# Patient Record
Sex: FEMALE | Race: WHITE | Employment: UNEMPLOYED | URBAN - METROPOLITAN AREA
[De-identification: names, ages, dates, MRNs, and addresses within clinical notes are randomized per-mention and may not be internally consistent; named-entity substitution may affect disease eponyms.]

---

## 2021-10-26 ENCOUNTER — OFFICE VISIT (OUTPATIENT)
Dept: PRIMARY CARE CLINIC | Facility: CLINIC | Age: 26
End: 2021-10-26
Payer: MEDICAID

## 2021-10-26 ENCOUNTER — PATIENT MESSAGE (OUTPATIENT)
Dept: PRIMARY CARE CLINIC | Facility: CLINIC | Age: 26
End: 2021-10-26

## 2021-10-26 VITALS
HEIGHT: 64 IN | SYSTOLIC BLOOD PRESSURE: 127 MMHG | WEIGHT: 156 LBS | DIASTOLIC BLOOD PRESSURE: 59 MMHG | OXYGEN SATURATION: 99 % | BODY MASS INDEX: 26.63 KG/M2 | TEMPERATURE: 98 F | HEART RATE: 80 BPM

## 2021-10-26 DIAGNOSIS — A60.00 GENITAL HERPES SIMPLEX, UNSPECIFIED SITE: ICD-10-CM

## 2021-10-26 DIAGNOSIS — N89.8 VAGINAL DISCHARGE: ICD-10-CM

## 2021-10-26 DIAGNOSIS — N76.0 ACUTE VAGINITIS: Primary | ICD-10-CM

## 2021-10-26 PROCEDURE — 99999 PR PBB SHADOW E&M-NEW PATIENT-LVL III: ICD-10-PCS | Mod: PBBFAC,,, | Performed by: NURSE PRACTITIONER

## 2021-10-26 PROCEDURE — 87481 CANDIDA DNA AMP PROBE: CPT | Mod: 59 | Performed by: NURSE PRACTITIONER

## 2021-10-26 PROCEDURE — 99999 PR PBB SHADOW E&M-NEW PATIENT-LVL III: CPT | Mod: PBBFAC,,, | Performed by: NURSE PRACTITIONER

## 2021-10-26 PROCEDURE — 99203 OFFICE O/P NEW LOW 30 MIN: CPT | Mod: S$PBB,,, | Performed by: NURSE PRACTITIONER

## 2021-10-26 PROCEDURE — 99203 OFFICE O/P NEW LOW 30 MIN: CPT | Mod: PBBFAC,PN | Performed by: NURSE PRACTITIONER

## 2021-10-26 PROCEDURE — 99203 PR OFFICE/OUTPT VISIT, NEW, LEVL III, 30-44 MIN: ICD-10-PCS | Mod: S$PBB,,, | Performed by: NURSE PRACTITIONER

## 2021-10-26 RX ORDER — METRONIDAZOLE 500 MG/1
500 TABLET ORAL EVERY 12 HOURS
Qty: 14 TABLET | Refills: 0 | Status: SHIPPED | OUTPATIENT
Start: 2021-10-26 | End: 2021-11-02

## 2021-10-26 RX ORDER — VALACYCLOVIR HYDROCHLORIDE 500 MG/1
500 TABLET, FILM COATED ORAL 2 TIMES DAILY
COMMUNITY
Start: 2021-07-05 | End: 2021-10-27 | Stop reason: SDUPTHER

## 2021-10-26 RX ORDER — CIPROFLOXACIN 500 MG/1
TABLET ORAL
Status: ON HOLD | COMMUNITY
Start: 2021-09-27 | End: 2022-07-14 | Stop reason: HOSPADM

## 2021-10-27 LAB
BACTERIAL VAGINOSIS DNA: POSITIVE
CANDIDA GLABRATA DNA: NEGATIVE
CANDIDA KRUSEI DNA: NEGATIVE
CANDIDA RRNA VAG QL PROBE: NEGATIVE
T VAGINALIS RRNA GENITAL QL PROBE: NEGATIVE

## 2021-10-27 RX ORDER — VALACYCLOVIR HYDROCHLORIDE 500 MG/1
500 TABLET, FILM COATED ORAL 2 TIMES DAILY
Qty: 60 TABLET | Refills: 3 | Status: SHIPPED | OUTPATIENT
Start: 2021-10-27

## 2022-07-09 ENCOUNTER — HOSPITAL ENCOUNTER (INPATIENT)
Facility: HOSPITAL | Age: 27
LOS: 5 days | Discharge: HOME | DRG: 178 | End: 2022-07-14
Attending: EMERGENCY MEDICINE | Admitting: INTERNAL MEDICINE
Payer: MEDICAID

## 2022-07-09 DIAGNOSIS — L02.512 ABSCESS OF LEFT HAND: ICD-10-CM

## 2022-07-09 DIAGNOSIS — R05.9 COUGH: ICD-10-CM

## 2022-07-09 DIAGNOSIS — J85.1 ABSCESS OF LOWER LOBE OF RIGHT LUNG WITH PNEUMONIA: Primary | ICD-10-CM

## 2022-07-09 DIAGNOSIS — J18.9 PNEUMONIA: ICD-10-CM

## 2022-07-09 PROBLEM — Z72.0 TOBACCO USER: Status: ACTIVE | Noted: 2022-07-09

## 2022-07-09 PROBLEM — F19.10 SUBSTANCE ABUSE: Status: ACTIVE | Noted: 2022-07-09

## 2022-07-09 LAB
ALBUMIN SERPL BCP-MCNC: 2.8 G/DL (ref 3.5–5.2)
ALP SERPL-CCNC: 104 U/L (ref 55–135)
ALT SERPL W/O P-5'-P-CCNC: 27 U/L (ref 10–44)
AMPHET+METHAMPHET UR QL: ABNORMAL
ANION GAP SERPL CALC-SCNC: 16 MMOL/L (ref 8–16)
AST SERPL-CCNC: 23 U/L (ref 10–40)
B-HCG UR QL: NEGATIVE
BARBITURATES UR QL SCN>200 NG/ML: NEGATIVE
BASOPHILS # BLD AUTO: 0.02 K/UL (ref 0–0.2)
BASOPHILS NFR BLD: 0.2 % (ref 0–1.9)
BENZODIAZ UR QL SCN>200 NG/ML: NEGATIVE
BILIRUB SERPL-MCNC: 0.8 MG/DL (ref 0.1–1)
BUN SERPL-MCNC: 4 MG/DL (ref 6–20)
BZE UR QL SCN: NEGATIVE
CALCIUM SERPL-MCNC: 8.8 MG/DL (ref 8.7–10.5)
CANNABINOIDS UR QL SCN: NEGATIVE
CHLORIDE SERPL-SCNC: 92 MMOL/L (ref 95–110)
CO2 SERPL-SCNC: 26 MMOL/L (ref 23–29)
CREAT SERPL-MCNC: 0.6 MG/DL (ref 0.5–1.4)
CREAT UR-MCNC: 181 MG/DL (ref 15–325)
DIFFERENTIAL METHOD: ABNORMAL
EOSINOPHIL # BLD AUTO: 0.1 K/UL (ref 0–0.5)
EOSINOPHIL NFR BLD: 0.6 % (ref 0–8)
ERYTHROCYTE [DISTWIDTH] IN BLOOD BY AUTOMATED COUNT: 14.6 % (ref 11.5–14.5)
EST. GFR  (AFRICAN AMERICAN): >60 ML/MIN/1.73 M^2
EST. GFR  (NON AFRICAN AMERICAN): >60 ML/MIN/1.73 M^2
GLUCOSE SERPL-MCNC: 90 MG/DL (ref 70–110)
HCT VFR BLD AUTO: 29.9 % (ref 37–48.5)
HGB BLD-MCNC: 9.6 G/DL (ref 12–16)
IMM GRANULOCYTES # BLD AUTO: 0.05 K/UL (ref 0–0.04)
IMM GRANULOCYTES NFR BLD AUTO: 0.5 % (ref 0–0.5)
LACTATE SERPL-SCNC: 1.7 MMOL/L (ref 0.5–2.2)
LYMPHOCYTES # BLD AUTO: 2.2 K/UL (ref 1–4.8)
LYMPHOCYTES NFR BLD: 21.6 % (ref 18–48)
MCH RBC QN AUTO: 24.7 PG (ref 27–31)
MCHC RBC AUTO-ENTMCNC: 32.1 G/DL (ref 32–36)
MCV RBC AUTO: 77 FL (ref 82–98)
METHADONE UR QL SCN>300 NG/ML: NEGATIVE
MONOCYTES # BLD AUTO: 1.2 K/UL (ref 0.3–1)
MONOCYTES NFR BLD: 11.3 % (ref 4–15)
NEUTROPHILS # BLD AUTO: 6.7 K/UL (ref 1.8–7.7)
NEUTROPHILS NFR BLD: 65.8 % (ref 38–73)
NRBC BLD-RTO: 0 /100 WBC
OPIATES UR QL SCN: NEGATIVE
PCP UR QL SCN>25 NG/ML: NEGATIVE
PLATELET # BLD AUTO: 503 K/UL (ref 150–450)
PMV BLD AUTO: 10 FL (ref 9.2–12.9)
POTASSIUM SERPL-SCNC: 3.6 MMOL/L (ref 3.5–5.1)
PROCALCITONIN SERPL IA-MCNC: 0.56 NG/ML
PROT SERPL-MCNC: 8.4 G/DL (ref 6–8.4)
RBC # BLD AUTO: 3.89 M/UL (ref 4–5.4)
SARS-COV-2 RDRP RESP QL NAA+PROBE: NEGATIVE
SODIUM SERPL-SCNC: 134 MMOL/L (ref 136–145)
TOXICOLOGY INFORMATION: ABNORMAL
WBC # BLD AUTO: 10.23 K/UL (ref 3.9–12.7)

## 2022-07-09 PROCEDURE — 81025 URINE PREGNANCY TEST: CPT | Performed by: NURSE PRACTITIONER

## 2022-07-09 PROCEDURE — 87205 SMEAR GRAM STAIN: CPT | Performed by: EMERGENCY MEDICINE

## 2022-07-09 PROCEDURE — 84145 PROCALCITONIN (PCT): CPT | Performed by: NURSE PRACTITIONER

## 2022-07-09 PROCEDURE — 25000003 PHARM REV CODE 250: Performed by: INTERNAL MEDICINE

## 2022-07-09 PROCEDURE — 63600175 PHARM REV CODE 636 W HCPCS: Performed by: EMERGENCY MEDICINE

## 2022-07-09 PROCEDURE — 80307 DRUG TEST PRSMV CHEM ANLYZR: CPT | Performed by: NURSE PRACTITIONER

## 2022-07-09 PROCEDURE — 99285 EMERGENCY DEPT VISIT HI MDM: CPT | Mod: 25

## 2022-07-09 PROCEDURE — 87116 MYCOBACTERIA CULTURE: CPT | Performed by: EMERGENCY MEDICINE

## 2022-07-09 PROCEDURE — 87070 CULTURE OTHR SPECIMN AEROBIC: CPT | Performed by: EMERGENCY MEDICINE

## 2022-07-09 PROCEDURE — 96365 THER/PROPH/DIAG IV INF INIT: CPT

## 2022-07-09 PROCEDURE — 80053 COMPREHEN METABOLIC PANEL: CPT | Performed by: NURSE PRACTITIONER

## 2022-07-09 PROCEDURE — 83605 ASSAY OF LACTIC ACID: CPT | Performed by: NURSE PRACTITIONER

## 2022-07-09 PROCEDURE — 11000001 HC ACUTE MED/SURG PRIVATE ROOM

## 2022-07-09 PROCEDURE — 25000003 PHARM REV CODE 250: Performed by: EMERGENCY MEDICINE

## 2022-07-09 PROCEDURE — 36415 COLL VENOUS BLD VENIPUNCTURE: CPT | Performed by: NURSE PRACTITIONER

## 2022-07-09 PROCEDURE — 87040 BLOOD CULTURE FOR BACTERIA: CPT | Mod: 59 | Performed by: NURSE PRACTITIONER

## 2022-07-09 PROCEDURE — 87206 SMEAR FLUORESCENT/ACID STAI: CPT | Performed by: EMERGENCY MEDICINE

## 2022-07-09 PROCEDURE — 85025 COMPLETE CBC W/AUTO DIFF WBC: CPT | Performed by: NURSE PRACTITIONER

## 2022-07-09 PROCEDURE — 87015 SPECIMEN INFECT AGNT CONCNTJ: CPT | Performed by: EMERGENCY MEDICINE

## 2022-07-09 PROCEDURE — U0002 COVID-19 LAB TEST NON-CDC: HCPCS | Performed by: NURSE PRACTITIONER

## 2022-07-09 RX ORDER — CEFEPIME HYDROCHLORIDE 1 G/50ML
2 INJECTION, SOLUTION INTRAVENOUS
Status: DISCONTINUED | OUTPATIENT
Start: 2022-07-10 | End: 2022-07-13

## 2022-07-09 RX ORDER — OXYCODONE AND ACETAMINOPHEN 5; 325 MG/1; MG/1
1 TABLET ORAL EVERY 4 HOURS PRN
Status: DISCONTINUED | OUTPATIENT
Start: 2022-07-09 | End: 2022-07-14 | Stop reason: HOSPADM

## 2022-07-09 RX ORDER — GUAIFENESIN 100 MG/5ML
200 SOLUTION ORAL EVERY 4 HOURS PRN
Status: DISCONTINUED | OUTPATIENT
Start: 2022-07-09 | End: 2022-07-14 | Stop reason: HOSPADM

## 2022-07-09 RX ORDER — ACETAMINOPHEN 325 MG/1
650 TABLET ORAL EVERY 6 HOURS PRN
Status: DISCONTINUED | OUTPATIENT
Start: 2022-07-09 | End: 2022-07-14 | Stop reason: HOSPADM

## 2022-07-09 RX ORDER — SODIUM CHLORIDE 9 MG/ML
INJECTION, SOLUTION INTRAVENOUS CONTINUOUS
Status: ACTIVE | OUTPATIENT
Start: 2022-07-09 | End: 2022-07-10

## 2022-07-09 RX ORDER — MORPHINE SULFATE 4 MG/ML
4 INJECTION, SOLUTION INTRAMUSCULAR; INTRAVENOUS EVERY 4 HOURS PRN
Status: DISCONTINUED | OUTPATIENT
Start: 2022-07-09 | End: 2022-07-10

## 2022-07-09 RX ORDER — OXYCODONE AND ACETAMINOPHEN 10; 325 MG/1; MG/1
1 TABLET ORAL EVERY 4 HOURS PRN
Status: DISCONTINUED | OUTPATIENT
Start: 2022-07-09 | End: 2022-07-14 | Stop reason: HOSPADM

## 2022-07-09 RX ORDER — ONDANSETRON 2 MG/ML
4 INJECTION INTRAMUSCULAR; INTRAVENOUS EVERY 8 HOURS PRN
Status: DISCONTINUED | OUTPATIENT
Start: 2022-07-09 | End: 2022-07-14 | Stop reason: HOSPADM

## 2022-07-09 RX ORDER — IPRATROPIUM BROMIDE AND ALBUTEROL SULFATE 2.5; .5 MG/3ML; MG/3ML
3 SOLUTION RESPIRATORY (INHALATION) EVERY 4 HOURS PRN
Status: DISCONTINUED | OUTPATIENT
Start: 2022-07-09 | End: 2022-07-14 | Stop reason: HOSPADM

## 2022-07-09 RX ORDER — CEFEPIME HYDROCHLORIDE 1 G/50ML
2 INJECTION, SOLUTION INTRAVENOUS
Status: COMPLETED | OUTPATIENT
Start: 2022-07-09 | End: 2022-07-09

## 2022-07-09 RX ORDER — IBUPROFEN 200 MG
1 TABLET ORAL DAILY
Status: DISCONTINUED | OUTPATIENT
Start: 2022-07-10 | End: 2022-07-14 | Stop reason: HOSPADM

## 2022-07-09 RX ORDER — MAG HYDROX/ALUMINUM HYD/SIMETH 200-200-20
30 SUSPENSION, ORAL (FINAL DOSE FORM) ORAL EVERY 6 HOURS PRN
Status: DISCONTINUED | OUTPATIENT
Start: 2022-07-09 | End: 2022-07-14 | Stop reason: HOSPADM

## 2022-07-09 RX ADMIN — OXYCODONE AND ACETAMINOPHEN 1 TABLET: 10; 325 TABLET ORAL at 11:07

## 2022-07-09 RX ADMIN — CEFEPIME 2 G: 2 INJECTION, POWDER, FOR SOLUTION INTRAVENOUS at 09:07

## 2022-07-09 RX ADMIN — SODIUM CHLORIDE: 0.9 INJECTION, SOLUTION INTRAVENOUS at 11:07

## 2022-07-09 RX ADMIN — SODIUM CHLORIDE 1000 ML: 0.9 INJECTION, SOLUTION INTRAVENOUS at 09:07

## 2022-07-09 NOTE — Clinical Note
Diagnosis: Pneumonia [780277]   Admitting Provider:: CHEY ROE [[id number]]   Future Attending Provider: CHEY ROE [[id number]]   Reason for IP Medical Treatment  (Clinical interventions that can only be accomplished in the IP setting? ) :: Abscess, IV abx   Estimated Length of Stay:: 2 midnights   I certify that Inpatient services for greater than or equal to 2 midnights are medically necessary:: Yes   Plans for Post-Acute care--if anticipated (pick the single best option):: A. No post acute care anticipated at this time   Special Needs:: No Special Needs [1]

## 2022-07-10 PROBLEM — R60.0 EDEMA OF RIGHT UPPER ARM: Status: ACTIVE | Noted: 2022-07-10

## 2022-07-10 PROCEDURE — 25000003 PHARM REV CODE 250: Performed by: FAMILY MEDICINE

## 2022-07-10 PROCEDURE — 99223 1ST HOSP IP/OBS HIGH 75: CPT | Mod: ,,, | Performed by: INTERNAL MEDICINE

## 2022-07-10 PROCEDURE — 11000001 HC ACUTE MED/SURG PRIVATE ROOM

## 2022-07-10 PROCEDURE — 63600175 PHARM REV CODE 636 W HCPCS: Performed by: INTERNAL MEDICINE

## 2022-07-10 PROCEDURE — 25000003 PHARM REV CODE 250: Performed by: INTERNAL MEDICINE

## 2022-07-10 PROCEDURE — 99223 PR INITIAL HOSPITAL CARE,LEVL III: ICD-10-PCS | Mod: ,,, | Performed by: INTERNAL MEDICINE

## 2022-07-10 PROCEDURE — 25000003 PHARM REV CODE 250: Performed by: NURSE PRACTITIONER

## 2022-07-10 PROCEDURE — 63600175 PHARM REV CODE 636 W HCPCS: Performed by: EMERGENCY MEDICINE

## 2022-07-10 PROCEDURE — 76937 US GUIDE VASCULAR ACCESS: CPT

## 2022-07-10 PROCEDURE — C1751 CATH, INF, PER/CENT/MIDLINE: HCPCS

## 2022-07-10 PROCEDURE — 25000003 PHARM REV CODE 250: Performed by: EMERGENCY MEDICINE

## 2022-07-10 PROCEDURE — 36410 VNPNXR 3YR/> PHY/QHP DX/THER: CPT

## 2022-07-10 RX ORDER — VANCOMYCIN HCL IN 5 % DEXTROSE 1G/250ML
1000 PLASTIC BAG, INJECTION (ML) INTRAVENOUS
Status: DISCONTINUED | OUTPATIENT
Start: 2022-07-10 | End: 2022-07-11

## 2022-07-10 RX ORDER — METHADONE HYDROCHLORIDE 10 MG/1
10 TABLET ORAL DAILY
Status: DISCONTINUED | OUTPATIENT
Start: 2022-07-10 | End: 2022-07-14 | Stop reason: HOSPADM

## 2022-07-10 RX ORDER — TALC
6 POWDER (GRAM) TOPICAL NIGHTLY PRN
Status: DISCONTINUED | OUTPATIENT
Start: 2022-07-10 | End: 2022-07-14 | Stop reason: HOSPADM

## 2022-07-10 RX ORDER — TRAZODONE HYDROCHLORIDE 50 MG/1
50 TABLET ORAL ONCE
Status: COMPLETED | OUTPATIENT
Start: 2022-07-10 | End: 2022-07-10

## 2022-07-10 RX ORDER — METRONIDAZOLE 500 MG/1
500 TABLET ORAL EVERY 8 HOURS
Status: DISCONTINUED | OUTPATIENT
Start: 2022-07-10 | End: 2022-07-13

## 2022-07-10 RX ADMIN — CEFEPIME 2 G: 2 INJECTION, POWDER, FOR SOLUTION INTRAVENOUS at 04:07

## 2022-07-10 RX ADMIN — OXYCODONE AND ACETAMINOPHEN 1 TABLET: 10; 325 TABLET ORAL at 03:07

## 2022-07-10 RX ADMIN — METRONIDAZOLE 500 MG: 500 TABLET ORAL at 09:07

## 2022-07-10 RX ADMIN — OXYCODONE AND ACETAMINOPHEN 1 TABLET: 10; 325 TABLET ORAL at 04:07

## 2022-07-10 RX ADMIN — VANCOMYCIN HYDROCHLORIDE 1500 MG: 1.5 INJECTION, POWDER, LYOPHILIZED, FOR SOLUTION INTRAVENOUS at 12:07

## 2022-07-10 RX ADMIN — METHADONE HYDROCHLORIDE 10 MG: 10 TABLET ORAL at 11:07

## 2022-07-10 RX ADMIN — TRAZODONE HYDROCHLORIDE 50 MG: 50 TABLET ORAL at 11:07

## 2022-07-10 RX ADMIN — METRONIDAZOLE 500 MG: 500 TABLET ORAL at 01:07

## 2022-07-10 RX ADMIN — VANCOMYCIN HYDROCHLORIDE 1000 MG: 1 INJECTION, POWDER, FOR SOLUTION INTRAVENOUS at 11:07

## 2022-07-10 RX ADMIN — CEFEPIME 2 G: 2 INJECTION, POWDER, FOR SOLUTION INTRAVENOUS at 09:07

## 2022-07-10 RX ADMIN — VANCOMYCIN HYDROCHLORIDE 1000 MG: 1 INJECTION, POWDER, FOR SOLUTION INTRAVENOUS at 12:07

## 2022-07-10 RX ADMIN — CEFEPIME 2 G: 2 INJECTION, POWDER, FOR SOLUTION INTRAVENOUS at 01:07

## 2022-07-10 NOTE — HPI
Carolina Frank is 27 y.o.  Asked to see for Abn CXR and chest CT  Presented with cough, chills, pleuritic chest pain, change sputum > 7 days  Smoker 1/2 PPD  Respiratory symptoms preceeded smoking METH  Homeless, living with male friend who supports drug habit  Had some hemoptysis last week not brown sputum  No night sweat, weight loss    Procal 0.56, UDS +ve amph  
Ms. Frank is a 27-year-old  female with no significant medical problems, substance abuse, tobacco user, presented to the ED complaining of for 5-6 days of right-sided pleuritic-type chest discomfort, associated with dry nonproductive cough.  She denies fever, chills.  Not requiring supplemental oxygen.  In the ED she was found to have 5.1 x 2.2 x 4.5 cm cavitary lesion with air-fluid level in the right medial lower lobe, concerning for abscess.  Received IV vancomycin, cefepime in the ED.  afebrile, hemodynamically stable.  UDS positive for amphetamines.    Admitting diagnosis:  Right lung abscess  
None

## 2022-07-10 NOTE — HOSPITAL COURSE
7/10:  Continue broad-spectrum antibiotics.  Cultures pending.  Patient currently stable on room air.  Plan for midline.  Patient endorses using heroin daily.  Will start  methadone for withdrawal symptoms.  7/11 Left hand abscess. Consult General surgery for possible I&D. Add daily hibiclens bath.  7/12 Ortho consulted for I&D of left hand abscess. Add ativan for anxiety.   7/13 Monitor anemia. Add Iv venofer. Repeat Ct scan of chest.  consulted to provide information for inpatient and outpatient options for substance abuse.   7/14 Patient stable and improved. CT scan chest improved. Patient discharged to home. Long discussion with Patient who verbalized wishes to quit Heroine. Patient given information for options for substance abuse treatment.

## 2022-07-10 NOTE — H&P
[facility] - Emergency Dept.  [facility] Medicine  History & Physical    Patient Name: Carolina Frank  MRN: [id number]  Patient Class: IP- Inpatient  Admission Date: 2022  Attending Physician: Aime Childress,*   Primary Care Provider: Primary Doctor No         Patient information was obtained from patient, past medical records and ER records.     Subjective:     Principal Problem:Abscess of middle lobe of right lung with pneumonia    Chief Complaint:   Chief Complaint   Patient presents with    Cough     Cough, productive cough since last Tuesday, right sided rib pain worse when coughing, requesting 'xray of lung'        HPI: Ms. Frank is a 27-year-old  female with no significant medical problems, substance abuse, tobacco user, presented to the ED complaining of for 5-6 days of right-sided pleuritic-type chest discomfort, associated with dry nonproductive cough.  She denies fever, chills.  Not requiring supplemental oxygen.  In the ED she was found to have 5.1 x 2.2 x 4.5 cm cavitary lesion with air-fluid level in the right medial lower lobe, concerning for abscess.  Received IV vancomycin, cefepime in the ED.  afebrile, hemodynamically stable.  UDS positive for amphetamines.    Admitting diagnosis:  Right lung abscess      Past Medical History:   Diagnosis Date    Overdose of illicit drug        History reviewed. No pertinent surgical history.    Review of patient's allergies indicates:  No Known Allergies    No current facility-administered medications on file prior to encounter.     Current Outpatient Medications on File Prior to Encounter   Medication Sig    ciprofloxacin HCl (CIPRO) 500 MG tablet SMARTSI Tablet(s) By Mouth Every 12 Hours    valACYclovir (VALTREX) 500 MG tablet Take 1 tablet (500 mg total) by mouth 2 (two) times daily.     Family History    Reviewed and Not Pertinent       Tobacco Use    Smoking status: Current Every Day Smoker     Types: Cigarettes    Smokeless tobacco:  Never Used   Substance and Sexual Activity    Alcohol use: Not Currently    Drug use: Not Currently    Sexual activity: Yes     Partners: Male     Birth control/protection: Injection     Review of Systems   Constitutional:  Positive for fatigue. Negative for chills and fever.   HENT: Negative.  Negative for congestion, rhinorrhea, sore throat and trouble swallowing.    Eyes: Negative.  Negative for visual disturbance.   Respiratory:  Positive for cough and shortness of breath. Negative for wheezing.    Cardiovascular:  Positive for chest pain (pleuritic right sided). Negative for palpitations.   Gastrointestinal: Negative.  Negative for abdominal pain, diarrhea, nausea and vomiting.   Endocrine: Negative.    Genitourinary: Negative.  Negative for dysuria and flank pain.   Musculoskeletal: Negative.  Negative for back pain.   Skin: Negative.  Negative for rash.   Allergic/Immunologic: Negative.    Neurological: Negative.  Negative for speech difficulty, weakness, numbness and headaches.   Hematological: Negative.    Psychiatric/Behavioral: Negative.  Negative for hallucinations. The patient is not nervous/anxious.    All other systems reviewed and are negative.    Objective:     Vital Signs (Most Recent):  Temp: 98.3 °F (36.8 °C) (07/09/22 1723)  Pulse: 94 (07/09/22 2124)  Resp: 18 (07/09/22 2124)  BP: 111/66 (07/09/22 2124)  SpO2: 100 % (07/09/22 2124) Vital Signs (24h Range):  Temp:  [98.3 °F (36.8 °C)] 98.3 °F (36.8 °C)  Pulse:  [] 94  Resp:  [18] 18  SpO2:  [98 %-100 %] 100 %  BP: (111-144)/(66-73) 111/66     Weight: 56.1 kg (123 lb 12.6 oz)  Body mass index is 21.25 kg/m².    Physical Exam  Vitals and nursing note reviewed.   Constitutional:       General: She is awake. She is not in acute distress.     Appearance: She is ill-appearing.   HENT:      Head: Normocephalic and atraumatic.      Mouth/Throat:      Mouth: Mucous membranes are moist.   Eyes:      General: No scleral icterus.      Conjunctiva/sclera: Conjunctivae normal.   Cardiovascular:      Rate and Rhythm: Normal rate and regular rhythm.      Heart sounds: No murmur heard.  Pulmonary:      Effort: Pulmonary effort is normal. No respiratory distress.      Breath sounds: Rhonchi present. No wheezing.   Abdominal:      Palpations: Abdomen is soft.      Tenderness: There is no abdominal tenderness.   Musculoskeletal:         General: No swelling. Normal range of motion.      Cervical back: Neck supple.   Skin:     General: Skin is warm.      Coloration: Skin is not jaundiced.   Neurological:      General: No focal deficit present.      Mental Status: She is alert and oriented to person, place, and time. Mental status is at baseline.   Psychiatric:         Attention and Perception: Attention normal.         Speech: Speech normal.         Behavior: Behavior is cooperative.           Significant Labs: All pertinent labs within the past 24 hours have been reviewed.  BMP:   Recent Labs   Lab 07/09/22 2108   GLU 90   *   K 3.6   CL 92*   CO2 26   BUN 4*   CREATININE 0.6   CALCIUM 8.8     CBC:   Recent Labs   Lab 07/09/22 2108   WBC 10.23   HGB 9.6*   HCT 29.9*   *     CMP:   Recent Labs   Lab 07/09/22 2108   *   K 3.6   CL 92*   CO2 26   GLU 90   BUN 4*   CREATININE 0.6   CALCIUM 8.8   PROT 8.4   ALBUMIN 2.8*   BILITOT 0.8   ALKPHOS 104   AST 23   ALT 27   ANIONGAP 16   EGFRNONAA >60       Significant Imaging: I have reviewed all pertinent imaging results/findings within the past 24 hours.  I have reviewed and interpreted all pertinent imaging results/findings within the past 24 hours.  CT: I have reviewed all pertinent results/findings within the past 24 hours and my personal findings are:       Imaging Results              CT Chest Without Contrast (Final result)  Result time 07/09/22 20:23:30      Final result by Ambrose Still MD (07/09/22 20:23:30)                   Impression:      Right lower lobe pneumonia with cavity  formation and air-fluid level measuring 5.1 x 2.2 x 4.5 cm suggestive of associated abscess    All CT scans at this facility use dose modulation, iterative reconstruction and/or weight based dosing when appropriate to reduce radiation dose to as low as reasonably achievable.      Electronically signed by: Cheko Boggs  Date:    07/09/2022  Time:    20:23               Narrative:    EXAMINATION:  CT CHEST WITHOUT CONTRAST    CLINICAL HISTORY:  Cough, persistent;abdnormal chest x-ray, radiologist recommended;    TECHNIQUE:  5 mm axial images were acquired using helical CT technique from the lung apices through costophrenic sulci.  No intravenous contrast was administered.    COMPARISON:  Prior radiograph    FINDINGS:  Air-fluid level involving the cavity in the medial right lower lobe measuring 5.1 x 2.2  x 4.5 cm worrisome for abscess.  There is adjacent consolidation involving the superior segment of right lower lobe extending to the basilar segment of right lower lobe.                                       X-Ray Chest PA And Lateral (Final result)  Result time 07/09/22 18:43:54      Final result by Ambrose Still MD (07/09/22 18:43:54)                   Impression:      Unusual focal opacity along the right hilar border with questionable air-fluid level.  Question pneumonia.  This is only seen on the frontal projection and not the lateral projection.  Recommend follow-up CT.      Electronically signed by: Cheko Boggs  Date:    07/09/2022  Time:    18:43               Narrative:    EXAMINATION:  XR CHEST PA AND LATERAL    CLINICAL HISTORY:  Cough, unspecified    TECHNIQUE:  PA and lateral views of the chest were performed.    COMPARISON:  None    FINDINGS:  Air-fluid level and opacity along the right hilar border.  No acute osseous injury.  Cardiac silhouette within normal limits.                                      I have independently reviewed and interpreted the EKG.     I have independently reviewed all  pertinent labs within the past 24 hours.    I have independently reviewed, visualized and interpreted all pertinent imaging results within the past 24 hours and discussed the findings with the ED physician, Dr. Childress          Assessment/Plan:     * Abscess of middle lobe of right lung with pneumonia  -continue IV vancomycin, IV cefepime empirically.  -bronchodilators as needed.  -supplemental oxygen as needed.  -pulmonary consult in a.m..  -keep NPO past midnight.      Substance abuse  -UDS positive for amphetamines      Tobacco user  -nicotine patch        VTE Risk Mitigation (From admission, onward)         Ordered     Place sequential compression device  Until discontinued         07/09/22 2244                 The patient is placed in OBSERVATION status.    Zack Farmer MD  Department of Hospital Medicine   [facility] - Emergency Dept.

## 2022-07-10 NOTE — ED PROVIDER NOTES
SCRIBE #1 NOTE: I, Yoshi Taylor, am scribing for, and in the presence of, Aime Childress MD. I have scribed the entire note.       History     Chief Complaint   Patient presents with    Cough     Cough, productive cough since last Tuesday, right sided rib pain worse when coughing, requesting 'xray of lung'     Review of patient's allergies indicates:  No Known Allergies      History of Present Illness     HPI    7/9/2022, 8:47 PM  History obtained from the patient      History of Present Illness: Carolina Frank is a 27 y.o. female patient who presents to the Emergency Department for evaluation of cough which onset gradually 4 days pta. Pt states she has been coughing up blood and phlegm along with having right sided rib pain that worsens with coughing. Symptoms are constant and moderate in severity. No other mitigating or exacerbating factors reported. Associated sxs include chills and diaphoresis. Patient denies any fever, HA, n/v/d, weakness, numbness, abdominal pain, back pain, and all other sxs at this time. No prior Tx reported. No further complaints or concerns at this time.       Arrival mode: Personal vehicle    PCP: Primary Doctor No        Past Medical History:  Past Medical History:   Diagnosis Date    Overdose of illicit drug        Past Surgical History:  History reviewed. No pertinent surgical history.      Family History:  History reviewed. No pertinent family history.    Social History:  Social History     Tobacco Use    Smoking status: Current Every Day Smoker     Types: Cigarettes    Smokeless tobacco: Never Used   Substance and Sexual Activity    Alcohol use: Not Currently    Drug use: Not Currently    Sexual activity: Yes     Partners: Male     Birth control/protection: Injection        Review of Systems     Review of Systems   Constitutional: Positive for chills and diaphoresis. Negative for fever.   HENT: Negative for sore throat.    Respiratory: Positive for cough. Negative for  "shortness of breath.    Cardiovascular: Positive for chest pain (Right sided).   Gastrointestinal: Negative for abdominal pain, diarrhea, nausea and vomiting.   Genitourinary: Negative for dysuria.   Musculoskeletal: Negative for back pain.   Skin: Negative for rash.   Neurological: Negative for dizziness, weakness, numbness and headaches.   Hematological: Does not bruise/bleed easily.   All other systems reviewed and are negative.     Physical Exam     Initial Vitals [07/09/22 1723]   BP Pulse Resp Temp SpO2   (!) 144/73 (!) 117 18 98.3 °F (36.8 °C) 98 %      MAP       --          Physical Exam   Nursing Notes and Vital Signs Reviewed.  Constitutional: Patient is in no acute distress. Well-developed and well-nourished.  Head: Atraumatic. Normocephalic.  Eyes: PERRL. EOM intact. Conjunctivae are not pale. No scleral icterus.  ENT: Mucous membranes are moist. Oropharynx is clear and symmetric.    Neck: Supple. Full ROM. No lymphadenopathy.  Cardiovascular: Regular rate. Regular rhythm. No murmurs, rubs, or gallops. Distal pulses are 2+ and symmetric.  Pulmonary/Chest: No respiratory distress. Right basilar rhonchi present.  Abdominal: Soft and non-distended.  There is no tenderness.  No rebound, guarding, or rigidity. Good bowel sounds.  Genitourinary: No CVA tenderness  Musculoskeletal: Moves all extremities. No obvious deformities. No edema. No calf tenderness.  Skin: Warm and dry.  Neurological:  Alert, awake, and appropriate.  Normal speech.  No acute focal neurological deficits are appreciated.  Psychiatric: Normal affect. Good eye contact. Appropriate in content.     ED Course   Procedures  ED Vital Signs:  Vitals:    07/09/22 1723 07/09/22 2124 07/09/22 2300 07/09/22 2303   BP: (!) 144/73 111/66 110/60    Pulse: (!) 117 94 98    Resp: 18 18 18 18   Temp: 98.3 °F (36.8 °C)  98.2 °F (36.8 °C)    TempSrc: Oral      SpO2: 98% 100% 98%    Weight: 56.1 kg (123 lb 12.6 oz)      Height: 5' 4" (1.626 m)    "       Abnormal Lab Results:  Labs Reviewed   CBC W/ AUTO DIFFERENTIAL - Abnormal; Notable for the following components:       Result Value    RBC 3.89 (*)     Hemoglobin 9.6 (*)     Hematocrit 29.9 (*)     MCV 77 (*)     MCH 24.7 (*)     RDW 14.6 (*)     Platelets 503 (*)     Immature Grans (Abs) 0.05 (*)     Mono # 1.2 (*)     All other components within normal limits   COMPREHENSIVE METABOLIC PANEL - Abnormal; Notable for the following components:    Sodium 134 (*)     Chloride 92 (*)     BUN 4 (*)     Albumin 2.8 (*)     All other components within normal limits   DRUG SCREEN PANEL, URINE EMERGENCY - Abnormal; Notable for the following components:    Amphetamine Screen, Ur Presumptive Positive (*)     All other components within normal limits    Narrative:     Specimen Source->Urine   PROCALCITONIN - Abnormal; Notable for the following components:    Procalcitonin 0.56 (*)     All other components within normal limits   CULTURE, BLOOD   CULTURE, BLOOD   CULTURE, RESPIRATORY   AFB CULTURE & SMEAR   PREGNANCY TEST, URINE RAPID    Narrative:     Specimen Source->Urine   SARS-COV-2 RNA AMPLIFICATION, QUAL   LACTIC ACID, PLASMA   DRUG SCREEN PANEL, URINE EMERGENCY   PROCALCITONIN   CBC W/ AUTO DIFFERENTIAL   MAGNESIUM   COMPREHENSIVE METABOLIC PANEL        All Lab Results:  Results for orders placed or performed during the hospital encounter of 07/09/22   Pregnancy, urine rapid (UPT)   Result Value Ref Range    Preg Test, Ur Negative    COVID-19 Rapid Screening   Result Value Ref Range    SARS-CoV-2 RNA, Amplification, Qual Negative Negative   CBC auto differential   Result Value Ref Range    WBC 10.23 3.90 - 12.70 K/uL    RBC 3.89 (L) 4.00 - 5.40 M/uL    Hemoglobin 9.6 (L) 12.0 - 16.0 g/dL    Hematocrit 29.9 (L) 37.0 - 48.5 %    MCV 77 (L) 82 - 98 fL    MCH 24.7 (L) 27.0 - 31.0 pg    MCHC 32.1 32.0 - 36.0 g/dL    RDW 14.6 (H) 11.5 - 14.5 %    Platelets 503 (H) 150 - 450 K/uL    MPV 10.0 9.2 - 12.9 fL    Immature  Granulocytes 0.5 0.0 - 0.5 %    Gran # (ANC) 6.7 1.8 - 7.7 K/uL    Immature Grans (Abs) 0.05 (H) 0.00 - 0.04 K/uL    Lymph # 2.2 1.0 - 4.8 K/uL    Mono # 1.2 (H) 0.3 - 1.0 K/uL    Eos # 0.1 0.0 - 0.5 K/uL    Baso # 0.02 0.00 - 0.20 K/uL    nRBC 0 0 /100 WBC    Gran % 65.8 38.0 - 73.0 %    Lymph % 21.6 18.0 - 48.0 %    Mono % 11.3 4.0 - 15.0 %    Eosinophil % 0.6 0.0 - 8.0 %    Basophil % 0.2 0.0 - 1.9 %    Differential Method Automated    Comprehensive metabolic panel   Result Value Ref Range    Sodium 134 (L) 136 - 145 mmol/L    Potassium 3.6 3.5 - 5.1 mmol/L    Chloride 92 (L) 95 - 110 mmol/L    CO2 26 23 - 29 mmol/L    Glucose 90 70 - 110 mg/dL    BUN 4 (L) 6 - 20 mg/dL    Creatinine 0.6 0.5 - 1.4 mg/dL    Calcium 8.8 8.7 - 10.5 mg/dL    Total Protein 8.4 6.0 - 8.4 g/dL    Albumin 2.8 (L) 3.5 - 5.2 g/dL    Total Bilirubin 0.8 0.1 - 1.0 mg/dL    Alkaline Phosphatase 104 55 - 135 U/L    AST 23 10 - 40 U/L    ALT 27 10 - 44 U/L    Anion Gap 16 8 - 16 mmol/L    eGFR if African American >60 >60 mL/min/1.73 m^2    eGFR if non African American >60 >60 mL/min/1.73 m^2   Lactic acid, plasma   Result Value Ref Range    Lactate (Lactic Acid) 1.7 0.5 - 2.2 mmol/L   Drug screen panel, in-house   Result Value Ref Range    Benzodiazepines Negative Negative    Methadone metabolites Negative Negative    Cocaine (Metab.) Negative Negative    Opiate Scrn, Ur Negative Negative    Barbiturate Screen, Ur Negative Negative    Amphetamine Screen, Ur Presumptive Positive (A) Negative    THC Negative Negative    Phencyclidine Negative Negative    Creatinine, Urine 181.0 15.0 - 325.0 mg/dL    Toxicology Information SEE COMMENT    Procalcitonin   Result Value Ref Range    Procalcitonin 0.56 (H) <0.25 ng/mL         Imaging Results:  Imaging Results          CT Chest Without Contrast (Final result)  Result time 07/09/22 20:23:30    Final result by Ambrose Still MD (07/09/22 20:23:30)                 Impression:      Right lower lobe  pneumonia with cavity formation and air-fluid level measuring 5.1 x 2.2 x 4.5 cm suggestive of associated abscess    All CT scans at this facility use dose modulation, iterative reconstruction and/or weight based dosing when appropriate to reduce radiation dose to as low as reasonably achievable.      Electronically signed by: Cheko Boggs  Date:    07/09/2022  Time:    20:23             Narrative:    EXAMINATION:  CT CHEST WITHOUT CONTRAST    CLINICAL HISTORY:  Cough, persistent;abdnormal chest x-ray, radiologist recommended;    TECHNIQUE:  5 mm axial images were acquired using helical CT technique from the lung apices through costophrenic sulci.  No intravenous contrast was administered.    COMPARISON:  Prior radiograph    FINDINGS:  Air-fluid level involving the cavity in the medial right lower lobe measuring 5.1 x 2.2  x 4.5 cm worrisome for abscess.  There is adjacent consolidation involving the superior segment of right lower lobe extending to the basilar segment of right lower lobe.                               X-Ray Chest PA And Lateral (Final result)  Result time 07/09/22 18:43:54    Final result by Ambrose Still MD (07/09/22 18:43:54)                 Impression:      Unusual focal opacity along the right hilar border with questionable air-fluid level.  Question pneumonia.  This is only seen on the frontal projection and not the lateral projection.  Recommend follow-up CT.      Electronically signed by: Cheko Boggs  Date:    07/09/2022  Time:    18:43             Narrative:    EXAMINATION:  XR CHEST PA AND LATERAL    CLINICAL HISTORY:  Cough, unspecified    TECHNIQUE:  PA and lateral views of the chest were performed.    COMPARISON:  None    FINDINGS:  Air-fluid level and opacity along the right hilar border.  No acute osseous injury.  Cardiac silhouette within normal limits.                                 The Emergency Provider reviewed the vital signs and test results, which are outlined above.      ED Discussion     8:50 PM: Discussed pt's case with Dr. Savage (Pulmonary Disease) who recommends ordering abx and sputum cultures and states he will see the pt in the morning.    10:36 PM: Discussed case with Dr. Monroy ([facility]). Dr. Garcia agrees with current care and management of pt and accepts admission.   Admitting Service: Hospital Medicine  Admitting Physician: Dr. Garcia  Admit to: Inpatient Med Surg Team A    10:36 PM: Re-evaluated pt. I have discussed test results, shared treatment plan, and the need for admission with patient and family at bedside. Pt and family express understanding at this time and agree with all information. All questions answered. Pt and family have no further questions or concerns at this time. Pt is ready for admit.         Medical Decision Making:   Clinical Tests:   Lab Tests: Ordered and Reviewed  Radiological Study: Ordered and Reviewed           ED Medication(s):  Medications   vancomycin - pharmacy to dose (has no administration in time range)   vancomycin 1.5 g in dextrose 5 % 250 mL IVPB (ready to mix) (1,500 mg Intravenous New Bag 7/10/22 0033)   cefepime in dextrose 5 % IVPB 2 g (has no administration in time range)   acetaminophen tablet 650 mg (has no administration in time range)   ondansetron injection 4 mg (has no administration in time range)   guaiFENesin 100 mg/5 ml syrup 200 mg (has no administration in time range)   aluminum-magnesium hydroxide-simethicone 200-200-20 mg/5 mL suspension 30 mL (has no administration in time range)   albuterol-ipratropium 2.5 mg-0.5 mg/3 mL nebulizer solution 3 mL (has no administration in time range)   0.9%  NaCl infusion ( Intravenous New Bag 7/9/22 2303)   oxyCODONE-acetaminophen 5-325 mg per tablet 1 tablet (has no administration in time range)   oxyCODONE-acetaminophen  mg per tablet 1 tablet (1 tablet Oral Given 7/9/22 2303)   morphine injection 4 mg (has no administration in time range)   nicotine 21 mg/24 hr 1  patch (has no administration in time range)   cefepime in dextrose 5 % IVPB 2 g (0 g Intravenous Stopped 7/9/22 2148)   sodium chloride 0.9% bolus 1,000 mL (0 mLs Intravenous Stopped 7/9/22 2212)       New Prescriptions    No medications on file               Scribe Attestation:   Scribe #1: I performed the above scribed service and the documentation accurately describes the services I performed. I attest to the accuracy of the note.     Attending:   Physician Attestation Statement for Scribe #1: I, Aime Childress MD, personally performed the services described in this documentation, as scribed by Yoshi Vazquez, in my presence, and it is both accurate and complete.           Clinical Impression       ICD-10-CM ICD-9-CM   1. Abscess of lower lobe of right lung with pneumonia  J85.1 513.0   2. Cough  R05.9 786.2   3. Pneumonia  J18.9 486       Disposition:   Disposition: Admitted  Condition: Fair         Aime Childress MD  07/10/22 0116

## 2022-07-10 NOTE — PLAN OF CARE
Pt on airborne isolation precautions. Pt has no emergency contact for SW to do initial assessment.

## 2022-07-10 NOTE — PROGRESS NOTES
Pharmacokinetic Initial Assessment: IV Vancomycin    Assessment/Plan:    Initiate intravenous vancomycin with loading dose of 1500 mg once followed by a maintenance dose of vancomycin 1000 mg IV every 12 hours  Desired empiric serum trough concentration is 15 to 20 mcg/mL  Draw vancomycin trough level 60 min prior to fourth dose on 07/11 at approximately 1130  Pharmacy will continue to follow and monitor vancomycin.      Please contact pharmacy at extension [phone] with any questions regarding this assessment.     Thank you for the consult,   Jazmin Lira       Patient brief summary:  Carolina Frank is a 27 y.o. female initiated on antimicrobial therapy with IV Vancomycin for treatment of suspected lower respiratory infection    Drug Allergies:   Review of patient's allergies indicates:  No Known Allergies    Actual Body Weight:   56.1 kg    Renal Function:   Estimated Creatinine Clearance: 122.7 mL/min (based on SCr of 0.6 mg/dL).,     Dialysis Method (if applicable):  N/A    CBC (last 72 hours):  Recent Labs   Lab Result Units 07/09/22  2108   WBC K/uL 10.23   Hemoglobin g/dL 9.6*   Hematocrit % 29.9*   Platelets K/uL 503*   Gran % % 65.8   Lymph % % 21.6   Mono % % 11.3   Eosinophil % % 0.6   Basophil % % 0.2   Differential Method  Automated       Metabolic Panel (last 72 hours):  Recent Labs   Lab Result Units 07/09/22  2103 07/09/22  2108   Sodium mmol/L  --  134*   Potassium mmol/L  --  3.6   Chloride mmol/L  --  92*   CO2 mmol/L  --  26   Glucose mg/dL  --  90   BUN mg/dL  --  4*   Creatinine mg/dL  --  0.6   Creatinine, Urine mg/dL 181.0  --    Albumin g/dL  --  2.8*   Total Bilirubin mg/dL  --  0.8   Alkaline Phosphatase U/L  --  104   AST U/L  --  23   ALT U/L  --  27       Drug levels (last 3 results):  No results for input(s): VANCOMYCINRA, VANCORANDOM, VANCOMYCINPE, VANCOPEAK, VANCOMYCINTR, VANCOTROUGH in the last 72 hours.    Microbiologic Results:  Microbiology Results (last 7 days)       Procedure  Component Value Units Date/Time    Blood Culture #1 **CANNOT BE ORDERED STAT** [[id number]] Collected: 07/09/22 2112    Order Status: Sent Specimen: Blood from Peripheral, Upper Arm, Left Updated: 07/10/22 0152    Blood Culture #2 **CANNOT BE ORDERED STAT** [[id number]] Collected: 07/09/22 2110    Order Status: Sent Specimen: Blood from Peripheral, Forearm, Right Updated: 07/10/22 0150    AFB Culture & Smear [[id number]] Collected: 07/09/22 2302    Order Status: Sent Specimen: Respiratory from Sputum Updated: 07/09/22 2315    Culture, Respiratory with Gram Stain [[id number]] Collected: 07/09/22 2302    Order Status: Sent Specimen: Respiratory from Sputum Updated: 07/09/22 2315

## 2022-07-10 NOTE — SUBJECTIVE & OBJECTIVE
Interval History: Continue broad-spectrum antibiotics.  Cultures pending.  Patient currently stable on room air.  Plan for midline.  Patient endorses using heroin daily.  Will start  methadone for withdrawal symptoms.    Review of Systems   Constitutional:  Negative for fatigue and fever.   HENT:  Negative for sinus pressure.    Eyes:  Negative for visual disturbance.   Respiratory:  Negative for shortness of breath.    Cardiovascular:  Negative for chest pain.   Gastrointestinal:  Negative for nausea and vomiting.   Genitourinary:  Negative for difficulty urinating.   Musculoskeletal:  Negative for back pain.   Skin:  Negative for rash.   Neurological:  Negative for headaches.   Psychiatric/Behavioral:  Negative for confusion.    Objective:     Vital Signs (Most Recent):  Temp: 98.5 °F (36.9 °C) (07/10/22 0715)  Pulse: 86 (07/10/22 0715)  Resp: 18 (07/10/22 0715)  BP: (!) 106/56 (07/10/22 0715)  SpO2: 99 % (07/10/22 0715)   Vital Signs (24h Range):  Temp:  [98.2 °F (36.8 °C)-98.7 °F (37.1 °C)] 98.5 °F (36.9 °C)  Pulse:  [] 86  Resp:  [17-20] 18  SpO2:  [97 %-100 %] 99 %  BP: ()/(53-73) 106/56     Weight: 56.1 kg (123 lb 12.6 oz)  Body mass index is 21.25 kg/m².    Intake/Output Summary (Last 24 hours) at 7/10/2022 1005  Last data filed at 7/10/2022 0200  Gross per 24 hour   Intake 1199 ml   Output --   Net 1199 ml      Physical Exam  Constitutional:       General: She is not in acute distress.     Appearance: She is well-developed. She is not diaphoretic.   HENT:      Head: Normocephalic and atraumatic.   Eyes:      Pupils: Pupils are equal, round, and reactive to light.   Cardiovascular:      Rate and Rhythm: Normal rate and regular rhythm.      Heart sounds: Normal heart sounds. No murmur heard.    No friction rub. No gallop.   Pulmonary:      Effort: Pulmonary effort is normal. No respiratory distress.      Breath sounds: No stridor. Rhonchi present. No wheezing or rales.   Abdominal:      General:  Bowel sounds are normal. There is no distension.      Palpations: Abdomen is soft. There is no mass.      Tenderness: There is no abdominal tenderness. There is no guarding.   Musculoskeletal:      Right lower leg: No edema.      Left lower leg: No edema.      Comments: Right upper extremity edema   Skin:     General: Skin is warm.      Findings: No erythema.   Neurological:      Mental Status: She is alert and oriented to person, place, and time.       Significant Labs: All pertinent labs within the past 24 hours have been reviewed.    Significant Imaging: I have reviewed all pertinent imaging results/findings within the past 24 hours.

## 2022-07-10 NOTE — ASSESSMENT & PLAN NOTE
-rule out AFB  -continue IV vancomycin, IV cefepime empirically.  -bronchodilators as needed.  -supplemental oxygen as needed.  -pulmonary consult in a.m..  -keep NPO past midnight.

## 2022-07-10 NOTE — HOSPITAL COURSE
Seen and examined  7/11 Anxious, wants to go home, still with pleuritic pain   7/12 Improved, pain nearly resolved

## 2022-07-10 NOTE — PROGRESS NOTES
Pharmacist Renal Dose Adjustment Note    Carolina Frank is a 27 y.o. female being treated with the medication cefepime    Patient Data:    Vital Signs (Most Recent):  Temp: 98.3 °F (36.8 °C) (07/09/22 1723)  Pulse: 94 (07/09/22 2124)  Resp: 18 (07/09/22 2124)  BP: 111/66 (07/09/22 2124)  SpO2: 100 % (07/09/22 2124) Vital Signs (72h Range):  Temp:  [98.3 °F (36.8 °C)]   Pulse:  []   Resp:  [18]   BP: (111-144)/(66-73)   SpO2:  [98 %-100 %]      Recent Labs   Lab 07/09/22 2108   CREATININE 0.6     Serum creatinine: 0.6 mg/dL 07/09/22 2108  Estimated creatinine clearance: 122.7 mL/min    Cefepime 1 g q8h will be changed to cefepime 2 g q8h for CrCl >60 mL/min and severe infection (indication: lower respiratory infection).     Pharmacist's Name: Jazmin Lira  Pharmacist's Extension: [phone]

## 2022-07-10 NOTE — PLAN OF CARE
Plan of care discussed with patient, safety precautions maintained, PRN pain medications administered as requested q4hrs prn, tolerated IV antibiotics, airborne/respiratory precautions maintained, sputum collected, call bell within reach, will continue to monitor

## 2022-07-10 NOTE — ED PROVIDER NOTES
HISTORY     Chief Complaint   Patient presents with    Cough     Cough, productive cough since last Tuesday, right sided rib pain worse when coughing, requesting 'xray of lung'     Review of patient's allergies indicates:  No Known Allergies     HPI   The history is provided by the patient.   Cough  This is a new problem. The current episode started several days ago. The problem occurs every few minutes. The problem has been waxing and waning. The cough is productive of blood-tinged sputum and productive of brown sputum. There has been no fever. Associated symptoms include sore throat. Pertinent negatives include no chest pain and no shortness of breath. She has tried nothing for the symptoms. The treatment provided no relief.      In an urgent care COVID and strep swabs are negative    PCP: Primary Doctor No     Past Medical History:  Past Medical History:   Diagnosis Date    Overdose of illicit drug         Past Surgical History:  History reviewed. No pertinent surgical history.     Family History:  History reviewed. No pertinent family history.     Social History:  Social History     Tobacco Use    Smoking status: Current Every Day Smoker     Types: Cigarettes    Smokeless tobacco: Never Used   Substance and Sexual Activity    Alcohol use: Not Currently    Drug use: Not Currently    Sexual activity: Yes     Partners: Male     Birth control/protection: Injection         ROS   Review of Systems   Constitutional: Negative for fever.   HENT: Positive for sore throat.    Respiratory: Positive for cough. Negative for shortness of breath.    Cardiovascular: Negative for chest pain.   Gastrointestinal: Negative for nausea.   Genitourinary: Negative for dysuria.   Musculoskeletal: Negative for back pain.   Skin: Negative for rash.   Neurological: Negative for weakness.   Hematological: Does not bruise/bleed easily.       PHYSICAL EXAM     Initial Vitals [07/09/22 1723]   BP Pulse Resp Temp SpO2   (!) 144/73  (!) 117 18 98.3 °F (36.8 °C) 98 %      MAP       --           Physical Exam    Constitutional: She appears well-developed and well-nourished. No distress.   HENT:   Head: Normocephalic and atraumatic.   Eyes: Conjunctivae are normal. Pupils are equal, round, and reactive to light.   Neck: Neck supple.   Normal range of motion.  Cardiovascular: Regular rhythm and normal heart sounds. Tachycardia present.    Pulmonary/Chest: Breath sounds normal.   Abdominal: Abdomen is soft. Bowel sounds are normal. She exhibits no distension. There is no abdominal tenderness. There is no rebound.   Musculoskeletal:         General: No edema. Normal range of motion.      Cervical back: Normal range of motion and neck supple.     Neurological: She is alert and oriented to person, place, and time. She has normal strength.   Skin: Skin is warm and dry.   Psychiatric: She has a normal mood and affect.          ED COURSE   Procedures  ED ONGOING VITALS:  Vitals:    07/13/22 0445 07/13/22 0515 07/13/22 0803 07/13/22 0834   BP: 105/75  105/62    Pulse: 86  95    Resp: 20 18 16 18   Temp: 98.7 °F (37.1 °C)  99.2 °F (37.3 °C)    TempSrc: Oral      SpO2: 98%  98%    Weight:       Height:        07/13/22 1221 07/13/22 1247 07/13/22 1624 07/13/22 1646   BP: 105/63      Pulse: 77  89    Resp: 18 17  16   Temp: 99 °F (37.2 °C)  98 °F (36.7 °C)    TempSrc: Oral  Oral    SpO2: 99%  99%    Weight:       Height:        07/13/22 2107 07/13/22 2108 07/14/22 0103 07/14/22 0446   BP: 113/63  107/61 108/71   Pulse: 96  88 83   Resp: 18 18 20 20   Temp: 98.8 °F (37.1 °C)  98.6 °F (37 °C) 98.4 °F (36.9 °C)   TempSrc: Oral  Oral Oral   SpO2: 97%  98% 98%   Weight:       Height:        07/14/22 0532 07/14/22 0843 07/14/22 0849   BP:   (!) 100/55   Pulse:   85   Resp: 18 16 16   Temp:   99 °F (37.2 °C)   TempSrc:      SpO2:   99%   Weight:      Height:            ABNORMAL LAB VALUES:  Labs Reviewed   CBC W/ AUTO DIFFERENTIAL - Abnormal; Notable for the  following components:       Result Value    RBC 3.89 (*)     Hemoglobin 9.6 (*)     Hematocrit 29.9 (*)     MCV 77 (*)     MCH 24.7 (*)     RDW 14.6 (*)     Platelets 503 (*)     Immature Grans (Abs) 0.05 (*)     Mono # 1.2 (*)     All other components within normal limits   COMPREHENSIVE METABOLIC PANEL - Abnormal; Notable for the following components:    Sodium 134 (*)     Chloride 92 (*)     BUN 4 (*)     Albumin 2.8 (*)     All other components within normal limits   DRUG SCREEN PANEL, URINE EMERGENCY - Abnormal; Notable for the following components:    Amphetamine Screen, Ur Presumptive Positive (*)     All other components within normal limits    Narrative:     Specimen Source->Urine   PROCALCITONIN - Abnormal; Notable for the following components:    Procalcitonin 0.56 (*)     All other components within normal limits   PREGNANCY TEST, URINE RAPID    Narrative:     Specimen Source->Urine   SARS-COV-2 RNA AMPLIFICATION, QUAL   LACTIC ACID, PLASMA   DRUG SCREEN PANEL, URINE EMERGENCY   PROCALCITONIN         ALL LAB VALUES:  Results for orders placed or performed during the hospital encounter of 07/09/22   Blood Culture #1 **CANNOT BE ORDERED STAT**    Specimen: Peripheral, Upper Arm, Left; Blood   Result Value Ref Range    Blood Culture, Routine No growth after 5 days.    Blood Culture #2 **CANNOT BE ORDERED STAT**    Specimen: Peripheral, Forearm, Right; Blood   Result Value Ref Range    Blood Culture, Routine No growth after 5 days.    Culture, Respiratory with Gram Stain    Specimen: Sputum; Respiratory   Result Value Ref Range    Respiratory Culture Normal respiratory elvia     Respiratory Culture No S aureus or Pseudomonas isolated.     Gram Stain (Respiratory) <10 epithelial cells per low power field.     Gram Stain (Respiratory) Rare WBC's     Gram Stain (Respiratory) Rare Gram positive cocci    AFB Culture & Smear    Specimen: Sputum; Respiratory   Result Value Ref Range    AFB Culture & Smear Culture  in progress     AFB CULTURE STAIN No acid fast bacilli seen.    Culture, MRSA    Specimen: Nares, Left; MRSA source   Result Value Ref Range    MRSA Surveillance Screen No MRSA isolated    Culture, Anaerobe    Specimen: Hand, Left; Abscess   Result Value Ref Range    Anaerobic Culture No anaerobes isolated    Aerobic culture    Specimen: Hand, Left; Abscess   Result Value Ref Range    Aerobic Bacterial Culture STAPHYLOCOCCUS AUREUS  Moderate   (A)        Susceptibility    Staphylococcus aureus - CULTURE, AEROBIC  (SPECIFY SOURCE)     Clindamycin <=0.5 Resistant mcg/mL     Erythromycin >4 Resistant mcg/mL     Oxacillin 0.5 Sensitive mcg/mL     Penicillin 8 Resistant mcg/mL     Trimeth/Sulfa <=0.5/9.5 Sensitive mcg/mL     Tetracycline <=4 Sensitive mcg/mL   AFB Culture & Smear    Specimen: Sputum, Expectorated; Respiratory   Result Value Ref Range    AFB Culture & Smear Culture in progress     AFB CULTURE STAIN No acid fast bacilli seen.    Fungus culture    Specimen: Hand, Left; Abscess   Result Value Ref Range    Fungus (Mycology) Culture Culture in progress    Gram stain    Specimen: Hand, Left; Abscess   Result Value Ref Range    Gram Stain Result No WBC's     Gram Stain Result No organisms seen    Pregnancy, urine rapid (UPT)   Result Value Ref Range    Preg Test, Ur Negative    COVID-19 Rapid Screening   Result Value Ref Range    SARS-CoV-2 RNA, Amplification, Qual Negative Negative   CBC auto differential   Result Value Ref Range    WBC 10.23 3.90 - 12.70 K/uL    RBC 3.89 (L) 4.00 - 5.40 M/uL    Hemoglobin 9.6 (L) 12.0 - 16.0 g/dL    Hematocrit 29.9 (L) 37.0 - 48.5 %    MCV 77 (L) 82 - 98 fL    MCH 24.7 (L) 27.0 - 31.0 pg    MCHC 32.1 32.0 - 36.0 g/dL    RDW 14.6 (H) 11.5 - 14.5 %    Platelets 503 (H) 150 - 450 K/uL    MPV 10.0 9.2 - 12.9 fL    Immature Granulocytes 0.5 0.0 - 0.5 %    Gran # (ANC) 6.7 1.8 - 7.7 K/uL    Immature Grans (Abs) 0.05 (H) 0.00 - 0.04 K/uL    Lymph # 2.2 1.0 - 4.8 K/uL    Mono # 1.2  (H) 0.3 - 1.0 K/uL    Eos # 0.1 0.0 - 0.5 K/uL    Baso # 0.02 0.00 - 0.20 K/uL    nRBC 0 0 /100 WBC    Gran % 65.8 38.0 - 73.0 %    Lymph % 21.6 18.0 - 48.0 %    Mono % 11.3 4.0 - 15.0 %    Eosinophil % 0.6 0.0 - 8.0 %    Basophil % 0.2 0.0 - 1.9 %    Differential Method Automated    Comprehensive metabolic panel   Result Value Ref Range    Sodium 134 (L) 136 - 145 mmol/L    Potassium 3.6 3.5 - 5.1 mmol/L    Chloride 92 (L) 95 - 110 mmol/L    CO2 26 23 - 29 mmol/L    Glucose 90 70 - 110 mg/dL    BUN 4 (L) 6 - 20 mg/dL    Creatinine 0.6 0.5 - 1.4 mg/dL    Calcium 8.8 8.7 - 10.5 mg/dL    Total Protein 8.4 6.0 - 8.4 g/dL    Albumin 2.8 (L) 3.5 - 5.2 g/dL    Total Bilirubin 0.8 0.1 - 1.0 mg/dL    Alkaline Phosphatase 104 55 - 135 U/L    AST 23 10 - 40 U/L    ALT 27 10 - 44 U/L    Anion Gap 16 8 - 16 mmol/L    eGFR if African American >60 >60 mL/min/1.73 m^2    eGFR if non African American >60 >60 mL/min/1.73 m^2   Lactic acid, plasma   Result Value Ref Range    Lactate (Lactic Acid) 1.7 0.5 - 2.2 mmol/L   Drug screen panel, in-house   Result Value Ref Range    Benzodiazepines Negative Negative    Methadone metabolites Negative Negative    Cocaine (Metab.) Negative Negative    Opiate Scrn, Ur Negative Negative    Barbiturate Screen, Ur Negative Negative    Amphetamine Screen, Ur Presumptive Positive (A) Negative    THC Negative Negative    Phencyclidine Negative Negative    Creatinine, Urine 181.0 15.0 - 325.0 mg/dL    Toxicology Information SEE COMMENT    Procalcitonin   Result Value Ref Range    Procalcitonin 0.56 (H) <0.25 ng/mL   Creatinine, serum   Result Value Ref Range    Creatinine 0.5 0.5 - 1.4 mg/dL    eGFR if African American >60 >60 mL/min/1.73 m^2    eGFR if non African American >60 >60 mL/min/1.73 m^2   VANCOMYCIN, TROUGH   Result Value Ref Range    Vancomycin-Trough 3.8 (L) 10.0 - 22.0 ug/mL   Fungal Immunodiffusion - Blood   Result Value Ref Range    Coccidioides Not Detected Not Detected    Aspergillus  Antibody None Detected None Detected    Blastomyces Antibody None Detected None Detected    Histoplasma Ab, Immunodiffusion None Detected None Detected   M. tuberculosis DNA by PCR   Result Value Ref Range    MTB Specimen Source Test Not Performed     M. tuberculosis DNA by PCR Test Not Performed    Quantiferon Gold TB   Result Value Ref Range    NIL 0.22055 IU/mL    TB1 - Nil 0.000 IU/mL    TB2 - Nil 0.000 IU/mL    Mitogen - Nil 0.403 IU/mL    TB Gold Plus Indeterminate (A) Negative   Rapid HIV   Result Value Ref Range    HIV Rapid Testing Non-Reactive Negative   VANCOMYCIN, TROUGH   Result Value Ref Range    Vancomycin-Trough 20.2 10.0 - 22.0 ug/mL   CBC Auto Differential   Result Value Ref Range    WBC 7.24 3.90 - 12.70 K/uL    RBC 3.46 (L) 4.00 - 5.40 M/uL    Hemoglobin 8.5 (L) 12.0 - 16.0 g/dL    Hematocrit 27.2 (L) 37.0 - 48.5 %    MCV 79 (L) 82 - 98 fL    MCH 24.6 (L) 27.0 - 31.0 pg    MCHC 31.3 (L) 32.0 - 36.0 g/dL    RDW 15.0 (H) 11.5 - 14.5 %    Platelets 549 (H) 150 - 450 K/uL    MPV 9.3 9.2 - 12.9 fL    Immature Granulocytes 0.3 0.0 - 0.5 %    Gran # (ANC) 3.9 1.8 - 7.7 K/uL    Immature Grans (Abs) 0.02 0.00 - 0.04 K/uL    Lymph # 2.4 1.0 - 4.8 K/uL    Mono # 0.8 0.3 - 1.0 K/uL    Eos # 0.1 0.0 - 0.5 K/uL    Baso # 0.03 0.00 - 0.20 K/uL    nRBC 0 0 /100 WBC    Gran % 54.2 38.0 - 73.0 %    Lymph % 33.3 18.0 - 48.0 %    Mono % 10.6 4.0 - 15.0 %    Eosinophil % 1.2 0.0 - 8.0 %    Basophil % 0.4 0.0 - 1.9 %    Differential Method Automated    Comprehensive Metabolic Panel   Result Value Ref Range    Sodium 139 136 - 145 mmol/L    Potassium 4.1 3.5 - 5.1 mmol/L    Chloride 104 95 - 110 mmol/L    CO2 27 23 - 29 mmol/L    Glucose 104 70 - 110 mg/dL    BUN 6 6 - 20 mg/dL    Creatinine 0.6 0.5 - 1.4 mg/dL    Calcium 8.4 (L) 8.7 - 10.5 mg/dL    Total Protein 6.9 6.0 - 8.4 g/dL    Albumin 2.4 (L) 3.5 - 5.2 g/dL    Total Bilirubin 0.2 0.1 - 1.0 mg/dL    Alkaline Phosphatase 76 55 - 135 U/L    AST 22 10 - 40 U/L     ALT 17 10 - 44 U/L    Anion Gap 8 8 - 16 mmol/L    eGFR if African American >60 >60 mL/min/1.73 m^2    eGFR if non African American >60 >60 mL/min/1.73 m^2   Magnesium   Result Value Ref Range    Magnesium 1.7 1.6 - 2.6 mg/dL   VANCOMYCIN, TROUGH   Result Value Ref Range    Vancomycin-Trough 7.3 (L) 10.0 - 22.0 ug/mL   CBC Auto Differential   Result Value Ref Range    WBC 6.36 3.90 - 12.70 K/uL    RBC 3.55 (L) 4.00 - 5.40 M/uL    Hemoglobin 8.7 (L) 12.0 - 16.0 g/dL    Hematocrit 28.0 (L) 37.0 - 48.5 %    MCV 79 (L) 82 - 98 fL    MCH 24.5 (L) 27.0 - 31.0 pg    MCHC 31.1 (L) 32.0 - 36.0 g/dL    RDW 15.3 (H) 11.5 - 14.5 %    Platelets 572 (H) 150 - 450 K/uL    MPV 9.3 9.2 - 12.9 fL    Immature Granulocytes 0.3 0.0 - 0.5 %    Gran # (ANC) 2.8 1.8 - 7.7 K/uL    Immature Grans (Abs) 0.02 0.00 - 0.04 K/uL    Lymph # 2.8 1.0 - 4.8 K/uL    Mono # 0.7 0.3 - 1.0 K/uL    Eos # 0.1 0.0 - 0.5 K/uL    Baso # 0.03 0.00 - 0.20 K/uL    nRBC 0 0 /100 WBC    Gran % 43.9 38.0 - 73.0 %    Lymph % 43.7 18.0 - 48.0 %    Mono % 10.2 4.0 - 15.0 %    Eosinophil % 1.4 0.0 - 8.0 %    Basophil % 0.5 0.0 - 1.9 %    Differential Method Automated    Magnesium   Result Value Ref Range    Magnesium 2.8 (H) 1.6 - 2.6 mg/dL           RADIOLOGY STUDIES:  Imaging Results          CT Chest Without Contrast (Final result)  Result time 07/09/22 20:23:30    Final result by Ambrose Still MD (07/09/22 20:23:30)                 Impression:      Right lower lobe pneumonia with cavity formation and air-fluid level measuring 5.1 x 2.2 x 4.5 cm suggestive of associated abscess    All CT scans at this facility use dose modulation, iterative reconstruction and/or weight based dosing when appropriate to reduce radiation dose to as low as reasonably achievable.      Electronically signed by: Cheko Boggs  Date:    07/09/2022  Time:    20:23             Narrative:    EXAMINATION:  CT CHEST WITHOUT CONTRAST    CLINICAL HISTORY:  Cough, persistent;abdnormal chest x-ray,  radiologist recommended;    TECHNIQUE:  5 mm axial images were acquired using helical CT technique from the lung apices through costophrenic sulci.  No intravenous contrast was administered.    COMPARISON:  Prior radiograph    FINDINGS:  Air-fluid level involving the cavity in the medial right lower lobe measuring 5.1 x 2.2  x 4.5 cm worrisome for abscess.  There is adjacent consolidation involving the superior segment of right lower lobe extending to the basilar segment of right lower lobe.                               X-Ray Chest PA And Lateral (Final result)  Result time 07/09/22 18:43:54    Final result by Ambrose Still MD (07/09/22 18:43:54)                 Impression:      Unusual focal opacity along the right hilar border with questionable air-fluid level.  Question pneumonia.  This is only seen on the frontal projection and not the lateral projection.  Recommend follow-up CT.      Electronically signed by: Cheko Boggs  Date:    07/09/2022  Time:    18:43             Narrative:    EXAMINATION:  XR CHEST PA AND LATERAL    CLINICAL HISTORY:  Cough, unspecified    TECHNIQUE:  PA and lateral views of the chest were performed.    COMPARISON:  None    FINDINGS:  Air-fluid level and opacity along the right hilar border.  No acute osseous injury.  Cardiac silhouette within normal limits.                                          The above vital signs and test results have been reviewed by the emergency provider.     ED Medications:  Current Discharge Medication List        Discharge Medications:  Discharge Medication List as of 7/14/2022 11:44 AM      START taking these medications    Details   acetaminophen (TYLENOL) 325 MG tablet Take 2 tablets (650 mg total) by mouth every 6 (six) hours as needed for Pain (Mild to moderate pain- Do not take with any other tylenol or acetaminophen containing products)., Starting Thu 7/14/2022, No Print      amoxicillin-clavulanate 875-125mg (AUGMENTIN) 875-125 mg per tablet Take  1 tablet by mouth every 12 (twelve) hours. Patient to be treated for 30 days for her lung abscess, Starting Thu 7/14/2022, Until Sat 8/13/2022, Normal      ascorbic acid, vitamin C, (VITAMIN C) 500 MG tablet Take 1 tablet (500 mg total) by mouth every evening., Starting Thu 7/14/2022, Until Sat 8/13/2022, Normal      chlorhexidine (HIBICLENS) 4 % external liquid Apply topically once daily at 6am. Bathe or shower with daily x 2 weeks for 14 days, Starting Thu 7/14/2022, Until Thu 7/28/2022, Normal      docusate sodium (COLACE) 100 MG capsule Take 1 capsule (100 mg total) by mouth 2 (two) times daily., Starting Thu 7/14/2022, Until Sat 8/13/2022, Normal      ferrous gluconate (FERGON) 324 MG tablet Take 1 tablet (324 mg total) by mouth 2 (two) times daily with meals., Starting Thu 7/14/2022, Until Sat 8/13/2022, Normal      LORazepam (ATIVAN) 0.5 MG tablet Take 1 tablet (0.5 mg total) by mouth daily as needed for Anxiety., Starting Thu 7/14/2022, Normal      magnesium oxide (MAG-OX) 400 mg (241.3 mg magnesium) tablet Take 1 tablet (400 mg total) by mouth once daily., Starting Fri 7/15/2022, Until Sun 8/14/2022, Normal      multivit-iron-FA-calcium-mins 9 mg iron-400 mcg Tab tablet Take 1 tablet by mouth once daily., Starting Fri 7/15/2022, Until Sun 8/14/2022, Normal      nicotine (NICODERM CQ) 21 mg/24 hr Place 1 patch onto the skin once daily., Starting Thu 7/14/2022, Until Thu 8/11/2022, Normal      oxyCODONE-acetaminophen (PERCOCET)  mg per tablet Take 1 tablet by mouth every 4 (four) hours as needed (Moderate to severe pain)., Starting Thu 7/14/2022, Normal             Follow-up Information     Buzz Tilley MD Follow up in 1 month(s).    Specialty: Pulmonary Disease  Why: Review CXR on return-  Take Xray 1-2 days before appointment with Dr. Tilley  Contact information:  [address]  [place] [address] [phone]             Primary Doctor No Follow up in 1 week(s).           [place]  [facility] Follow up.    Why: Take walkins from 5:15 am - 9 am   [phone]           [facility] Follow up on 7/15/2022.    Why: Hospital Follow-up   3:30PM  Contact information:  [address] [place] [address]  [phone]                      Patient transferred care to Dr. Childress.     Patient admitted to hospital medicine with Pulmonology following.       MEDICAL DECISION MAKING                 CLINICAL IMPRESSION       ICD-10-CM ICD-9-CM   1. Abscess of lower lobe of right lung with pneumonia  J85.1 513.0   2. Cough  R05.9 786.2   3. Pneumonia  J18.9 486   4. Abscess of left hand  L02.512 682.4       Disposition:   Disposition: Admitted  Condition: Nader Martinez NP  07/23/22 1732

## 2022-07-10 NOTE — CONSULTS
[facility] Surg  Pulmonology  Consult Note    Patient Name: Carolina Frank  MRN: [id number]  Admission Date: 7/9/2022  Hospital Length of Stay: 1 days  Code Status: No Order  Attending Physician: Gerard Garcia MD  Primary Care Provider: Primary Doctor No   Principal Problem: Abscess of middle lobe of right lung with pneumonia    [unfilled]  Subjective:     HPI:  Carolina Frank is 27 y.o.  Asked to see for Abn CXR and chest CT  Presented with cough, chills, pleuritic chest pain, change sputum > 7 days  Smoker 1/2 PPD  Respiratory symptoms preceeded smoking METH  Homeless, living with male friend who supports drug habit  Had some hemoptysis last week not brown sputum  No night sweat, weight loss    Procal 0.56, UDS +ve amph      Past Medical History:   Diagnosis Date    Overdose of illicit drug        History reviewed. No pertinent surgical history.    Review of patient's allergies indicates:  No Known Allergies    Family History    None       Tobacco Use    Smoking status: Current Every Day Smoker     Types: Cigarettes    Smokeless tobacco: Never Used   Substance and Sexual Activity    Alcohol use: Not Currently    Drug use: Not Currently    Sexual activity: Yes     Partners: Male     Birth control/protection: Injection         Review of Systems   Constitutional:  Positive for chills, fatigue and fever.   Eyes: Negative.    Respiratory:  Positive for cough.    Cardiovascular: Negative.    Gastrointestinal: Negative.    Endocrine: Negative.    Genitourinary: Negative.    Musculoskeletal: Negative.    Allergic/Immunologic: Negative.    Neurological: Negative.    Hematological: Negative.    Psychiatric/Behavioral: Negative.     Objective:     Vital Signs (Most Recent):  Temp: 98.5 °F (36.9 °C) (07/10/22 0715)  Pulse: 86 (07/10/22 0715)  Resp: 18 (07/10/22 0715)  BP: (!) 106/56 (07/10/22 0715)  SpO2: 99 % (07/10/22 0715)   Vital Signs (24h Range):  Temp:  [98.2 °F (36.8 °C)-98.7 °F (37.1 °C)] 98.5 °F (36.9 °C)  Pulse:   [] 86  Resp:  [17-20] 18  SpO2:  [97 %-100 %] 99 %  BP: ()/(53-73) 106/56     Weight: 56.1 kg (123 lb 12.6 oz)  Body mass index is 21.25 kg/m².      Intake/Output Summary (Last 24 hours) at 7/10/2022 0809  Last data filed at 7/10/2022 0200  Gross per 24 hour   Intake 1199 ml   Output --   Net 1199 ml       Physical Exam  Vitals and nursing note reviewed.   HENT:      Head: Normocephalic and atraumatic.      Mouth/Throat:      Mouth: Mucous membranes are moist.   Eyes:      Pupils: Pupils are equal, round, and reactive to light.   Cardiovascular:      Rate and Rhythm: Normal rate and regular rhythm.      Pulses: Normal pulses.      Heart sounds: Normal heart sounds.   Pulmonary:      Effort: Pulmonary effort is normal.      Breath sounds: Normal breath sounds.       Abdominal:      General: Bowel sounds are normal.      Palpations: Abdomen is soft.   Musculoskeletal:         General: Normal range of motion.      Cervical back: Normal range of motion.   Skin:     General: Skin is warm.   Neurological:      Mental Status: She is alert and oriented to person, place, and time.       Vents:       Lines/Drains/Airways       Peripheral Intravenous Line  Duration                  Peripheral IV - Single Lumen 07/09/22 2114 22 G Right Antecubital <1 day                    Significant Labs:    CBC/Anemia Profile:  Recent Labs   Lab 07/09/22 2108   WBC 10.23   HGB 9.6*   HCT 29.9*   *   MCV 77*   RDW 14.6*        Chemistries:  Recent Labs   Lab 07/09/22 2108   *   K 3.6   CL 92*   CO2 26   BUN 4*   CREATININE 0.6   CALCIUM 8.8   ALBUMIN 2.8*   PROT 8.4   BILITOT 0.8   ALKPHOS 104   ALT 27   AST 23       ABGs: No results for input(s): PH, PCO2, HCO3, POCSATURATED, BE in the last 48 hours.  POCT Glucose: No results for input(s): POCTGLUCOSE in the last 48 hours.  Respiratory Culture: No results for input(s): GSRESP, RESPIRATORYC in the last 48 hours.  All pertinent labs within the past 24 hours have been  reviewed.    Significant Imaging:   I have reviewed all pertinent imaging results/findings within the past 24 hours.    CT Chest Without Contrast  Narrative: EXAMINATION:  CT CHEST WITHOUT CONTRAST    CLINICAL HISTORY:  Cough, persistent;abdnormal chest x-ray, radiologist recommended;    TECHNIQUE:  5 mm axial images were acquired using helical CT technique from the lung apices through costophrenic sulci.  No intravenous contrast was administered.    COMPARISON:  Prior radiograph    FINDINGS:  Air-fluid level involving the cavity in the medial right lower lobe measuring 5.1 x 2.2  x 4.5 cm worrisome for abscess.  There is adjacent consolidation involving the superior segment of right lower lobe extending to the basilar segment of right lower lobe.  Impression: Right lower lobe pneumonia with cavity formation and air-fluid level measuring 5.1 x 2.2 x 4.5 cm suggestive of associated abscess    All CT scans at this facility use dose modulation, iterative reconstruction and/or weight based dosing when appropriate to reduce radiation dose to as low as reasonably achievable.    Electronically signed by: Cheko Boggs  Date:    07/09/2022  Time:    20:23  X-Ray Chest PA And Lateral  Narrative: EXAMINATION:  XR CHEST PA AND LATERAL    CLINICAL HISTORY:  Cough, unspecified    TECHNIQUE:  PA and lateral views of the chest were performed.    COMPARISON:  None    FINDINGS:  Air-fluid level and opacity along the right hilar border.  No acute osseous injury.  Cardiac silhouette within normal limits.  Impression: Unusual focal opacity along the right hilar border with questionable air-fluid level.  Question pneumonia.  This is only seen on the frontal projection and not the lateral projection.  Recommend follow-up CT.    Electronically signed by: Cheko Boggs  Date:    07/09/2022  Time:    18:43       ABG  No results for input(s): PH, PO2, PCO2, HCO3, BE in the last 168 hours.  Assessment/Plan:     * Abscess of middle lobe of  right lung with pneumonia  Sputum Culture  AFB X 3  Quantiferon  Cont Abx: VANC, CEFEPIME , FLAGYL  MRSA screen  Deescalate to UNASYN  regular diet  No pulmonary procedure anticipated today    Edema of right upper arm  US for DVT  Consider PICC if problems with PIV    Tobacco user  Smoking cessation    Substance abuse   consult  abstinence          Thank you for your consult. I will follow-up with patient. Please contact us if you have any additional questions.     Jared Savage MD  Pulmonology  [facility] Med [facility]

## 2022-07-10 NOTE — PROGRESS NOTES
[facility] Medicine  Progress Note    Patient Name: Carolina Frank  MRN: [id number]  Patient Class: IP- Inpatient   Admission Date: 7/9/2022  Length of Stay: 1 days  Attending Physician: Gerard Garcia MD  Primary Care Provider: Primary Doctor No        Subjective:     Principal Problem:Abscess of middle lobe of right lung with pneumonia        HPI:  Ms. Frank is a 27-year-old  female with no significant medical problems, substance abuse, tobacco user, presented to the ED complaining of for 5-6 days of right-sided pleuritic-type chest discomfort, associated with dry nonproductive cough.  She denies fever, chills.  Not requiring supplemental oxygen.  In the ED she was found to have 5.1 x 2.2 x 4.5 cm cavitary lesion with air-fluid level in the right medial lower lobe, concerning for abscess.  Received IV vancomycin, cefepime in the ED.  afebrile, hemodynamically stable.  UDS positive for amphetamines.    Admitting diagnosis:  Right lung abscess      Overview/Hospital Course:  7/10:  Continue broad-spectrum antibiotics.  Cultures pending.  Patient currently stable on room air.  Plan for midline.  Patient endorses using heroin daily.  Will start  methadone for withdrawal symptoms.      Interval History: Continue broad-spectrum antibiotics.  Cultures pending.  Patient currently stable on room air.  Plan for midline.  Patient endorses using heroin daily.  Will start  methadone for withdrawal symptoms.    Review of Systems   Constitutional:  Negative for fatigue and fever.   HENT:  Negative for sinus pressure.    Eyes:  Negative for visual disturbance.   Respiratory:  Negative for shortness of breath.    Cardiovascular:  Negative for chest pain.   Gastrointestinal:  Negative for nausea and vomiting.   Genitourinary:  Negative for difficulty urinating.   Musculoskeletal:  Negative for back pain.   Skin:  Negative for rash.   Neurological:  Negative for headaches.   Psychiatric/Behavioral:  Negative for  confusion.    Objective:     Vital Signs (Most Recent):  Temp: 98.5 °F (36.9 °C) (07/10/22 0715)  Pulse: 86 (07/10/22 0715)  Resp: 18 (07/10/22 0715)  BP: (!) 106/56 (07/10/22 0715)  SpO2: 99 % (07/10/22 0715)   Vital Signs (24h Range):  Temp:  [98.2 °F (36.8 °C)-98.7 °F (37.1 °C)] 98.5 °F (36.9 °C)  Pulse:  [] 86  Resp:  [17-20] 18  SpO2:  [97 %-100 %] 99 %  BP: ()/(53-73) 106/56     Weight: 56.1 kg (123 lb 12.6 oz)  Body mass index is 21.25 kg/m².    Intake/Output Summary (Last 24 hours) at 7/10/2022 1005  Last data filed at 7/10/2022 0200  Gross per 24 hour   Intake 1199 ml   Output --   Net 1199 ml      Physical Exam  Constitutional:       General: She is not in acute distress.     Appearance: She is well-developed. She is not diaphoretic.   HENT:      Head: Normocephalic and atraumatic.   Eyes:      Pupils: Pupils are equal, round, and reactive to light.   Cardiovascular:      Rate and Rhythm: Normal rate and regular rhythm.      Heart sounds: Normal heart sounds. No murmur heard.    No friction rub. No gallop.   Pulmonary:      Effort: Pulmonary effort is normal. No respiratory distress.      Breath sounds: No stridor. Rhonchi present. No wheezing or rales.   Abdominal:      General: Bowel sounds are normal. There is no distension.      Palpations: Abdomen is soft. There is no mass.      Tenderness: There is no abdominal tenderness. There is no guarding.   Musculoskeletal:      Right lower leg: No edema.      Left lower leg: No edema.      Comments: Right upper extremity edema   Skin:     General: Skin is warm.      Findings: No erythema.   Neurological:      Mental Status: She is alert and oriented to person, place, and time.       Significant Labs: All pertinent labs within the past 24 hours have been reviewed.    Significant Imaging: I have reviewed all pertinent imaging results/findings within the past 24 hours.          Assessment/Plan:      * Abscess of middle lobe of right lung with  pneumonia  -rule out AFB  -continue IV vancomycin, IV cefepime empirically.  -bronchodilators as needed.  -supplemental oxygen as needed.  -pulmonary consult in a.m..  -keep NPO past midnight.    7/10:  Continue broad-spectrum antibiotics  Pulmonology on case  Sputum cultures pending  Awaiting AFB      Edema of right upper arm  Awaiting ultrasound to rule out DVT      Tobacco user  -nicotine patch      Substance abuse  -UDS positive for amphetamines    7/10:  UDS positive for Only amphetamines  However patient is reports frequent heroin use  States that she does have withdrawal symptoms whenever she stops using  Start methadone empirically        VTE Risk Mitigation (From admission, onward)         Ordered     Place sequential compression device  Until discontinued         07/09/22 9352                Discharge Planning   CLIFTON:      Code Status: Not on file   Is the patient medically ready for discharge?:     Reason for patient still in hospital (select all that apply): Patient trending condition                     Gerard Garcia MD  Department of Hospital Medicine   [facility]

## 2022-07-10 NOTE — ASSESSMENT & PLAN NOTE
-rule out AFB  -continue IV vancomycin, IV cefepime empirically.  -bronchodilators as needed.  -supplemental oxygen as needed.  -pulmonary consult in a.m..  -keep NPO past midnight.    7/10:  Continue broad-spectrum antibiotics  Pulmonology on case  Sputum cultures pending  Awaiting AFB

## 2022-07-10 NOTE — ASSESSMENT & PLAN NOTE
Sputum Culture  AFB X 3  Quantiferon  Cont Abx: VANC, CEFEPIME , FLAGYL  MRSA screen  Deescalate to UNASYN  regular diet  No pulmonary procedure anticipated today

## 2022-07-10 NOTE — SUBJECTIVE & OBJECTIVE
Past Medical History:   Diagnosis Date    Overdose of illicit drug        History reviewed. No pertinent surgical history.    Review of patient's allergies indicates:  No Known Allergies    Family History    None       Tobacco Use    Smoking status: Current Every Day Smoker     Types: Cigarettes    Smokeless tobacco: Never Used   Substance and Sexual Activity    Alcohol use: Not Currently    Drug use: Not Currently    Sexual activity: Yes     Partners: Male     Birth control/protection: Injection         Review of Systems   Constitutional:  Positive for chills, fatigue and fever.   Eyes: Negative.    Respiratory:  Positive for cough.    Cardiovascular: Negative.    Gastrointestinal: Negative.    Endocrine: Negative.    Genitourinary: Negative.    Musculoskeletal: Negative.    Allergic/Immunologic: Negative.    Neurological: Negative.    Hematological: Negative.    Psychiatric/Behavioral: Negative.     Objective:     Vital Signs (Most Recent):  Temp: 98.5 °F (36.9 °C) (07/10/22 0715)  Pulse: 86 (07/10/22 0715)  Resp: 18 (07/10/22 0715)  BP: (!) 106/56 (07/10/22 0715)  SpO2: 99 % (07/10/22 0715)   Vital Signs (24h Range):  Temp:  [98.2 °F (36.8 °C)-98.7 °F (37.1 °C)] 98.5 °F (36.9 °C)  Pulse:  [] 86  Resp:  [17-20] 18  SpO2:  [97 %-100 %] 99 %  BP: ()/(53-73) 106/56     Weight: 56.1 kg (123 lb 12.6 oz)  Body mass index is 21.25 kg/m².      Intake/Output Summary (Last 24 hours) at 7/10/2022 0809  Last data filed at 7/10/2022 0200  Gross per 24 hour   Intake 1199 ml   Output --   Net 1199 ml       Physical Exam  Vitals and nursing note reviewed.   HENT:      Head: Normocephalic and atraumatic.      Mouth/Throat:      Mouth: Mucous membranes are moist.   Eyes:      Pupils: Pupils are equal, round, and reactive to light.   Cardiovascular:      Rate and Rhythm: Normal rate and regular rhythm.      Pulses: Normal pulses.      Heart sounds: Normal heart sounds.   Pulmonary:      Effort: Pulmonary effort is  normal.      Breath sounds: Normal breath sounds.       Abdominal:      General: Bowel sounds are normal.      Palpations: Abdomen is soft.   Musculoskeletal:         General: Normal range of motion.      Cervical back: Normal range of motion.   Skin:     General: Skin is warm.   Neurological:      Mental Status: She is alert and oriented to person, place, and time.       Vents:       Lines/Drains/Airways       Peripheral Intravenous Line  Duration                  Peripheral IV - Single Lumen 07/09/22 2114 22 G Right Antecubital <1 day                    Significant Labs:    CBC/Anemia Profile:  Recent Labs   Lab 07/09/22 2108   WBC 10.23   HGB 9.6*   HCT 29.9*   *   MCV 77*   RDW 14.6*        Chemistries:  Recent Labs   Lab 07/09/22 2108   *   K 3.6   CL 92*   CO2 26   BUN 4*   CREATININE 0.6   CALCIUM 8.8   ALBUMIN 2.8*   PROT 8.4   BILITOT 0.8   ALKPHOS 104   ALT 27   AST 23       ABGs: No results for input(s): PH, PCO2, HCO3, POCSATURATED, BE in the last 48 hours.  POCT Glucose: No results for input(s): POCTGLUCOSE in the last 48 hours.  Respiratory Culture: No results for input(s): GSRESP, RESPIRATORYC in the last 48 hours.  All pertinent labs within the past 24 hours have been reviewed.    Significant Imaging:   I have reviewed all pertinent imaging results/findings within the past 24 hours.    CT Chest Without Contrast  Narrative: EXAMINATION:  CT CHEST WITHOUT CONTRAST    CLINICAL HISTORY:  Cough, persistent;abdnormal chest x-ray, radiologist recommended;    TECHNIQUE:  5 mm axial images were acquired using helical CT technique from the lung apices through costophrenic sulci.  No intravenous contrast was administered.    COMPARISON:  Prior radiograph    FINDINGS:  Air-fluid level involving the cavity in the medial right lower lobe measuring 5.1 x 2.2  x 4.5 cm worrisome for abscess.  There is adjacent consolidation involving the superior segment of right lower lobe extending to the basilar  segment of right lower lobe.  Impression: Right lower lobe pneumonia with cavity formation and air-fluid level measuring 5.1 x 2.2 x 4.5 cm suggestive of associated abscess    All CT scans at this facility use dose modulation, iterative reconstruction and/or weight based dosing when appropriate to reduce radiation dose to as low as reasonably achievable.    Electronically signed by: Cheko Boggs  Date:    07/09/2022  Time:    20:23  X-Ray Chest PA And Lateral  Narrative: EXAMINATION:  XR CHEST PA AND LATERAL    CLINICAL HISTORY:  Cough, unspecified    TECHNIQUE:  PA and lateral views of the chest were performed.    COMPARISON:  None    FINDINGS:  Air-fluid level and opacity along the right hilar border.  No acute osseous injury.  Cardiac silhouette within normal limits.  Impression: Unusual focal opacity along the right hilar border with questionable air-fluid level.  Question pneumonia.  This is only seen on the frontal projection and not the lateral projection.  Recommend follow-up CT.    Electronically signed by: Cheko Boggs  Date:    07/09/2022  Time:    18:43

## 2022-07-10 NOTE — ASSESSMENT & PLAN NOTE
-UDS positive for amphetamines    7/10:  UDS positive for Only amphetamines  However patient is reports frequent heroin use  States that she does have withdrawal symptoms whenever she stops using  Start methadone empirically

## 2022-07-11 PROBLEM — E87.1 HYPONATREMIA: Status: ACTIVE | Noted: 2022-07-11

## 2022-07-11 PROBLEM — L02.512 ABSCESS OF LEFT HAND: Status: ACTIVE | Noted: 2022-07-11

## 2022-07-11 PROBLEM — E88.09 HYPOALBUMINEMIA: Status: ACTIVE | Noted: 2022-07-11

## 2022-07-11 PROBLEM — D75.839 THROMBOCYTOSIS: Status: ACTIVE | Noted: 2022-07-11

## 2022-07-11 PROBLEM — F17.200 NICOTINE DEPENDENCE: Status: ACTIVE | Noted: 2022-07-09

## 2022-07-11 PROBLEM — D50.9 MICROCYTIC ANEMIA: Status: ACTIVE | Noted: 2022-07-11

## 2022-07-11 LAB
CREAT SERPL-MCNC: 0.5 MG/DL (ref 0.5–1.4)
EST. GFR  (AFRICAN AMERICAN): >60 ML/MIN/1.73 M^2
EST. GFR  (NON AFRICAN AMERICAN): >60 ML/MIN/1.73 M^2
VANCOMYCIN TROUGH SERPL-MCNC: 3.8 UG/ML (ref 10–22)

## 2022-07-11 PROCEDURE — 99233 PR SUBSEQUENT HOSPITAL CARE,LEVL III: ICD-10-PCS | Mod: ,,, | Performed by: INTERNAL MEDICINE

## 2022-07-11 PROCEDURE — 25000003 PHARM REV CODE 250: Performed by: INTERNAL MEDICINE

## 2022-07-11 PROCEDURE — 63600175 PHARM REV CODE 636 W HCPCS: Performed by: INTERNAL MEDICINE

## 2022-07-11 PROCEDURE — 99233 SBSQ HOSP IP/OBS HIGH 50: CPT | Mod: ,,, | Performed by: INTERNAL MEDICINE

## 2022-07-11 PROCEDURE — 11000001 HC ACUTE MED/SURG PRIVATE ROOM

## 2022-07-11 PROCEDURE — 99406 BEHAV CHNG SMOKING 3-10 MIN: CPT

## 2022-07-11 PROCEDURE — 80202 ASSAY OF VANCOMYCIN: CPT | Performed by: INTERNAL MEDICINE

## 2022-07-11 PROCEDURE — 25000003 PHARM REV CODE 250: Performed by: NURSE PRACTITIONER

## 2022-07-11 PROCEDURE — 25000003 PHARM REV CODE 250: Performed by: FAMILY MEDICINE

## 2022-07-11 PROCEDURE — 87081 CULTURE SCREEN ONLY: CPT | Performed by: INTERNAL MEDICINE

## 2022-07-11 PROCEDURE — 82565 ASSAY OF CREATININE: CPT | Performed by: INTERNAL MEDICINE

## 2022-07-11 RX ORDER — VANCOMYCIN HCL IN 5 % DEXTROSE 1G/250ML
1000 PLASTIC BAG, INJECTION (ML) INTRAVENOUS
Status: DISCONTINUED | OUTPATIENT
Start: 2022-07-11 | End: 2022-07-12

## 2022-07-11 RX ORDER — ASCORBIC ACID 500 MG
500 TABLET ORAL NIGHTLY
Status: DISCONTINUED | OUTPATIENT
Start: 2022-07-11 | End: 2022-07-14 | Stop reason: HOSPADM

## 2022-07-11 RX ORDER — DOCUSATE SODIUM 100 MG/1
100 CAPSULE, LIQUID FILLED ORAL 2 TIMES DAILY
Status: DISCONTINUED | OUTPATIENT
Start: 2022-07-11 | End: 2022-07-14 | Stop reason: HOSPADM

## 2022-07-11 RX ORDER — FERROUS GLUCONATE 324(37.5)
324 TABLET ORAL 2 TIMES DAILY WITH MEALS
Status: DISCONTINUED | OUTPATIENT
Start: 2022-07-11 | End: 2022-07-14 | Stop reason: HOSPADM

## 2022-07-11 RX ADMIN — OXYCODONE AND ACETAMINOPHEN 1 TABLET: 10; 325 TABLET ORAL at 09:07

## 2022-07-11 RX ADMIN — CEFEPIME 2 G: 2 INJECTION, POWDER, FOR SOLUTION INTRAVENOUS at 09:07

## 2022-07-11 RX ADMIN — METRONIDAZOLE 500 MG: 500 TABLET ORAL at 10:07

## 2022-07-11 RX ADMIN — Medication 6 MG: at 10:07

## 2022-07-11 RX ADMIN — Medication 324 MG: at 05:07

## 2022-07-11 RX ADMIN — METHADONE HYDROCHLORIDE 10 MG: 10 TABLET ORAL at 09:07

## 2022-07-11 RX ADMIN — THERA TABS 1 TABLET: TAB at 02:07

## 2022-07-11 RX ADMIN — CEFEPIME 2 G: 2 INJECTION, POWDER, FOR SOLUTION INTRAVENOUS at 05:07

## 2022-07-11 RX ADMIN — VANCOMYCIN HYDROCHLORIDE 1000 MG: 1 INJECTION, POWDER, FOR SOLUTION INTRAVENOUS at 10:07

## 2022-07-11 RX ADMIN — VANCOMYCIN HYDROCHLORIDE 1000 MG: 1 INJECTION, POWDER, FOR SOLUTION INTRAVENOUS at 02:07

## 2022-07-11 RX ADMIN — METRONIDAZOLE 500 MG: 500 TABLET ORAL at 05:07

## 2022-07-11 RX ADMIN — OXYCODONE HYDROCHLORIDE AND ACETAMINOPHEN 500 MG: 500 TABLET ORAL at 09:07

## 2022-07-11 RX ADMIN — DOCUSATE SODIUM 100 MG: 100 CAPSULE, LIQUID FILLED ORAL at 09:07

## 2022-07-11 RX ADMIN — METRONIDAZOLE 500 MG: 500 TABLET ORAL at 02:07

## 2022-07-11 RX ADMIN — CEFEPIME 2 G: 2 INJECTION, POWDER, FOR SOLUTION INTRAVENOUS at 02:07

## 2022-07-11 NOTE — CONSULTS
Pharmacokinetic Assessment Follow Up: IV Vancomycin    Vancomycin serum concentration assessment(s):    The trough level was drawn correctly and can be used to guide therapy at this time. The measurement is below the desired definitive target range of 15 to 20 mcg/mL.    Vancomycin Regimen Plan:    Change regimen to Vancomycin 1000 mg IV every 8 hours with next serum trough concentration measured at 1300 prior to the fourth new dose on 7/12.    Drug levels (last 3 results):  Recent Labs   Lab Result Units 07/11/22  1141   Vancomycin-Trough ug/mL 3.8*       Pharmacy will continue to follow and monitor vancomycin.    Please contact pharmacy at [phone] for questions regarding this assessment.    Thank you for the consult,   Alvin Markham, PharmD 7/11/2022 2:35 PM       Patient brief summary:  Carolina Frank is a 27 y.o. female initiated on antimicrobial therapy with IV Vancomycin for treatment of lower respiratory infection.    The patient's new regimen is vancomycin 1000 mg IV q8 h.     Drug Allergies:   Review of patient's allergies indicates:  No Known Allergies    Actual Body Weight:   56.6 kg    Renal Function:   Estimated Creatinine Clearance: 147.2 mL/min (based on SCr of 0.5 mg/dL).,     Dialysis Method (if applicable):  Not on RRT    CBC (last 72 hours):  Recent Labs   Lab Result Units 07/09/22 2108   WBC K/uL 10.23   Hemoglobin g/dL 9.6*   Hematocrit % 29.9*   Platelets K/uL 503*   Gran % % 65.8   Lymph % % 21.6   Mono % % 11.3   Eosinophil % % 0.6   Basophil % % 0.2   Differential Method  Automated       Metabolic Panel (last 72 hours):  Recent Labs   Lab Result Units 07/09/22 2103 07/09/22 2108 07/11/22  1141   Sodium mmol/L  --  134*  --    Potassium mmol/L  --  3.6  --    Chloride mmol/L  --  92*  --    CO2 mmol/L  --  26  --    Glucose mg/dL  --  90  --    BUN mg/dL  --  4*  --    Creatinine mg/dL  --  0.6 0.5   Creatinine, Urine mg/dL 181.0  --   --    Albumin g/dL  --  2.8*  --    Total  Bilirubin mg/dL  --  0.8  --    Alkaline Phosphatase U/L  --  104  --    AST U/L  --  23  --    ALT U/L  --  27  --        Vancomycin Administrations:  vancomycin given in the last 96 hours                   vancomycin in dextrose 5 % 1 gram/250 mL IVPB 1,000 mg (mg) 1,000 mg New Bag 07/11/22 1408     1,000 mg New Bag 07/10/22 2337      Restarted  1310     1,000 mg New Bag  1209    vancomycin 1.5 g in dextrose 5 % 250 mL IVPB (ready to mix) (mg) 1,500 mg New Bag 07/10/22 0033                Microbiologic Results:  Microbiology Results (last 7 days)     Procedure Component Value Units Date/Time    Culture, MRSA [[id number]] Collected: 07/11/22 1101    Order Status: Sent Specimen: MRSA source from Nares, Left Updated: 07/11/22 1126    Culture, Respiratory with Gram Stain [[id number]] Collected: 07/09/22 2302    Order Status: Completed Specimen: Respiratory from Sputum Updated: 07/11/22 0741     Respiratory Culture Normal respiratory elvia     Gram Stain (Respiratory) <10 epithelial cells per low power field.     Gram Stain (Respiratory) Rare WBC's     Gram Stain (Respiratory) Rare Gram positive cocci    Blood Culture #1 **CANNOT BE ORDERED STAT** [[id number]] Collected: 07/09/22 2112    Order Status: Completed Specimen: Blood from Peripheral, Upper Arm, Left Updated: 07/11/22 0613     Blood Culture, Routine No Growth to date      No Growth to date    Blood Culture #2 **CANNOT BE ORDERED STAT** [[id number]] Collected: 07/09/22 2110    Order Status: Completed Specimen: Blood from Peripheral, Forearm, Right Updated: 07/11/22 0613     Blood Culture, Routine No Growth to date      No Growth to date    AFB Culture & Smear [[id number]] Collected: 07/09/22 2302    Order Status: Sent Specimen: Respiratory from Sputum Updated: 07/10/22 0945    Culture, Respiratory with Gram Stain [[id number]]     Order Status: No result Specimen: Respiratory from Sputum, Expectorated     AFB Culture & Smear [[id number]]     Order Status: No result  Specimen: Respiratory from Sputum, Expectorated     AFB Culture & Smear [[id number]]     Order Status: No result Specimen: Respiratory from Sputum, Expectorated

## 2022-07-11 NOTE — SUBJECTIVE & OBJECTIVE
Interval History:  Left hand abscess. Consult General surgery for possible I&D. Add daily hibiclens bath.    Review of Systems   Constitutional:  Positive for appetite change. Negative for activity change, chills, diaphoresis, fatigue and fever.   HENT:  Negative for ear pain, facial swelling, hearing loss and sinus pressure.    Eyes:  Negative for pain and redness.   Respiratory:  Positive for cough. Negative for shortness of breath.    Cardiovascular:  Negative for chest pain, palpitations and leg swelling.   Gastrointestinal:  Negative for abdominal distention, abdominal pain, blood in stool, constipation, diarrhea, nausea and vomiting.   Endocrine: Negative for polydipsia and polyphagia.   Genitourinary:  Negative for difficulty urinating, dysuria, flank pain and hematuria.   Musculoskeletal:  Negative for gait problem, neck pain and neck stiffness.   Skin:  Negative for color change.   Allergic/Immunologic: Negative for food allergies.   Neurological:  Negative for seizures, facial asymmetry, speech difficulty and weakness.   Hematological:  Does not bruise/bleed easily.   Psychiatric/Behavioral:  Negative for agitation, behavioral problems, confusion, hallucinations and suicidal ideas. The patient is not nervous/anxious.    Objective:     Vital Signs (Most Recent):  Temp: 98.5 °F (36.9 °C) (07/11/22 1636)  Pulse: 71 (07/11/22 1636)  Resp: 17 (07/11/22 1636)  BP: (!) 106/56 (07/11/22 1636)  SpO2: 98 % (07/11/22 1636)   Vital Signs (24h Range):  Temp:  [97.8 °F (36.6 °C)-98.8 °F (37.1 °C)] 98.5 °F (36.9 °C)  Pulse:  [] 71  Resp:  [16-18] 17  SpO2:  [98 %-99 %] 98 %  BP: ()/(51-61) 106/56     Weight: 56.6 kg (124 lb 12.5 oz)  Body mass index is 21.42 kg/m².    Intake/Output Summary (Last 24 hours) at 7/11/2022 1813  Last data filed at 7/11/2022 1200  Gross per 24 hour   Intake 360 ml   Output --   Net 360 ml      Physical Exam  Constitutional:       General: She is not in acute distress.      Appearance: She is well-developed. She is ill-appearing. She is not diaphoretic.   HENT:      Head: Normocephalic and atraumatic.      Mouth/Throat:      Mouth: Mucous membranes are moist.   Eyes:      General:         Right eye: No discharge.         Left eye: No discharge.      Extraocular Movements: Extraocular movements intact.      Conjunctiva/sclera: Conjunctivae normal.      Pupils: Pupils are equal, round, and reactive to light.   Cardiovascular:      Rate and Rhythm: Normal rate and regular rhythm.   Pulmonary:      Effort: Pulmonary effort is normal. No respiratory distress.      Breath sounds: No rhonchi.   Abdominal:      General: There is no distension.      Tenderness: There is no abdominal tenderness. There is no guarding.   Genitourinary:     Comments: Not examined  Musculoskeletal:         General: Normal range of motion.      Cervical back: Normal range of motion and neck supple. No rigidity or tenderness.      Right lower leg: No edema.      Left lower leg: No edema.      Comments: Right upper extremity edema   Skin:     General: Skin is warm and dry.      Capillary Refill: Capillary refill takes less than 2 seconds.   Neurological:      General: No focal deficit present.      Mental Status: She is alert and oriented to person, place, and time. Mental status is at baseline.      Cranial Nerves: No cranial nerve deficit.   Psychiatric:         Mood and Affect: Mood normal.         Behavior: Behavior normal.         Thought Content: Thought content normal.         Judgment: Judgment normal.     Lab Results   Component Value Date    WBC 10.23 07/09/2022    HGB 9.6 (L) 07/09/2022    HCT 29.9 (L) 07/09/2022    MCV 77 (L) 07/09/2022     (H) 07/09/2022       BMP  Lab Results   Component Value Date     (L) 07/09/2022    K 3.6 07/09/2022    CL 92 (L) 07/09/2022    CO2 26 07/09/2022    BUN 4 (L) 07/09/2022    CREATININE 0.5 07/11/2022    CALCIUM 8.8 07/09/2022    ANIONGAP 16 07/09/2022     ESTGFRAFRICA >60 07/11/2022    EGFRNONAA >60 07/11/2022

## 2022-07-11 NOTE — PROGRESS NOTES
[facility] Surg  Pulmonology  Progress Note    Patient Name: Carolina Frank  MRN: [id number]  Admission Date: 7/9/2022  Hospital Length of Stay: 2 days  Code Status: No Order  Attending Provider: Eusebio Monroy MD  Primary Care Provider: Primary Doctor No   Principal Problem: Abscess of lower lobe of right lung with pneumonia    Subjective: anxious     Past Medical History:   Diagnosis Date    Overdose of illicit drug        History reviewed. No pertinent surgical history.    Review of patient's allergies indicates:  No Known Allergies    Family History    None       Tobacco Use    Smoking status: Current Every Day Smoker     Types: Cigarettes    Smokeless tobacco: Never Used   Substance and Sexual Activity    Alcohol use: Not Currently    Drug use: Not Currently    Sexual activity: Yes     Partners: Male     Birth control/protection: Injection         Review of Systems   Constitutional:  Positive for chills, fatigue and fever.   Eyes: Negative.    Respiratory:  Positive for cough.    Cardiovascular: Negative.    Gastrointestinal: Negative.    Endocrine: Negative.    Genitourinary: Negative.    Musculoskeletal: Negative.    Allergic/Immunologic: Negative.    Neurological: Negative.    Hematological: Negative.    Psychiatric/Behavioral: Negative.     Objective:     Vital Signs (Most Recent):  Temp: 98.3 °F (36.8 °C) (07/11/22 1204)  Pulse: 79 (07/11/22 1204)  Resp: 16 (07/11/22 1204)  BP: (!) 99/51 (07/11/22 1204)  SpO2: 98 % (07/11/22 1204)   Vital Signs (24h Range):  Temp:  [97.8 °F (36.6 °C)-98.8 °F (37.1 °C)] 98.3 °F (36.8 °C)  Pulse:  [] 79  Resp:  [16-18] 16  SpO2:  [98 %-99 %] 98 %  BP: ()/(51-61) 99/51     Weight: 56.6 kg (124 lb 12.5 oz)  Body mass index is 21.42 kg/m².      Intake/Output Summary (Last 24 hours) at 7/11/2022 1536  Last data filed at 7/11/2022 0800  Gross per 24 hour   Intake 487.43 ml   Output --   Net 487.43 ml         Physical Exam  Vitals and nursing note reviewed.    HENT:      Head: Normocephalic and atraumatic.      Mouth/Throat:      Mouth: Mucous membranes are moist.   Eyes:      Pupils: Pupils are equal, round, and reactive to light.   Cardiovascular:      Rate and Rhythm: Normal rate and regular rhythm.      Pulses: Normal pulses.      Heart sounds: Normal heart sounds.   Pulmonary:      Effort: Pulmonary effort is normal.      Breath sounds: Examination of the right-lower field reveals decreased breath sounds and rales. Decreased breath sounds and rales present.       Abdominal:      General: Bowel sounds are normal.      Palpations: Abdomen is soft.   Musculoskeletal:         General: Normal range of motion.      Cervical back: Normal range of motion.   Skin:     General: Skin is warm.   Neurological:      Mental Status: She is alert and oriented to person, place, and time.       Vents:       Lines/Drains/Airways       Peripheral Intravenous Line  Duration                  Midline Catheter Insertion/Assessment  - Single Lumen 07/10/22 1100 Left basilic vein (medial side of arm) 20g x 8cm 1 day                    Significant Labs:    CBC/Anemia Profile:  Recent Labs   Lab 07/09/22 2108   WBC 10.23   HGB 9.6*   HCT 29.9*   *   MCV 77*   RDW 14.6*          Chemistries:  Recent Labs   Lab 07/09/22 2108 07/11/22  1141   *  --    K 3.6  --    CL 92*  --    CO2 26  --    BUN 4*  --    CREATININE 0.6 0.5   CALCIUM 8.8  --    ALBUMIN 2.8*  --    PROT 8.4  --    BILITOT 0.8  --    ALKPHOS 104  --    ALT 27  --    AST 23  --          ABGs: No results for input(s): PH, PCO2, HCO3, POCSATURATED, BE in the last 48 hours.  POCT Glucose: No results for input(s): POCTGLUCOSE in the last 48 hours.  Respiratory Culture:   Recent Labs   Lab 07/09/22  2302   GSRESP <10 epithelial cells per low power field.  Rare WBC's  Rare Gram positive cocci   RESPIRATORYC Normal respiratory elvia     All pertinent labs within the past 24 hours have been reviewed.    Significant Imaging:    I have reviewed all pertinent imaging results/findings within the past 24 hours.    US Upper Extremity Veins Bilateral  Narrative: EXAMINATION:  US UPPER EXTREMITY VEINS BILATERAL    CLINICAL HISTORY:  swelling right upper extremity;    COMPARISON:  None available    FINDINGS:  The deep veins of the bilateral upper extremities are widely patent without evidence of thrombosis.  Impression: Negative for DVT.    Electronically signed by: Deandre Austin MD  Date:    07/10/2022  Time:    12:25       ABG  No results for input(s): PH, PO2, PCO2, HCO3, BE in the last 168 hours.  Assessment/Plan:     * Abscess of lower lobe of right lung with pneumonia - superior segment  Sputum Culture  AFB X 3  Quantiferon  Cont Abx: VANC, CEFEPIME , FLAGYL  MRSA screen  Deescalate to UNASYN  regular diet  No pulmonary procedure anticipated today  7/11/22 AFB negative x1, pattern of abscess in superior segment of right lower lobe typical for aspiration pneumonia . Will need long term antibiotics. Malignancy is unlikely     Hyponatremia  Monitor    Nicotine dependence  Smoking cessation    Substance abuse- Heroine   consult  abstinence           Buzz Tilley MD  Pulmonology  [facility]

## 2022-07-11 NOTE — SUBJECTIVE & OBJECTIVE
Past Medical History:   Diagnosis Date    Overdose of illicit drug        History reviewed. No pertinent surgical history.    Review of patient's allergies indicates:  No Known Allergies    Family History    None       Tobacco Use    Smoking status: Current Every Day Smoker     Types: Cigarettes    Smokeless tobacco: Never Used   Substance and Sexual Activity    Alcohol use: Not Currently    Drug use: Not Currently    Sexual activity: Yes     Partners: Male     Birth control/protection: Injection         Review of Systems   Constitutional:  Positive for chills, fatigue and fever.   Eyes: Negative.    Respiratory:  Positive for cough.    Cardiovascular: Negative.    Gastrointestinal: Negative.    Endocrine: Negative.    Genitourinary: Negative.    Musculoskeletal: Negative.    Allergic/Immunologic: Negative.    Neurological: Negative.    Hematological: Negative.    Psychiatric/Behavioral: Negative.     Objective:     Vital Signs (Most Recent):  Temp: 98.3 °F (36.8 °C) (07/11/22 1204)  Pulse: 79 (07/11/22 1204)  Resp: 16 (07/11/22 1204)  BP: (!) 99/51 (07/11/22 1204)  SpO2: 98 % (07/11/22 1204)   Vital Signs (24h Range):  Temp:  [97.8 °F (36.6 °C)-98.8 °F (37.1 °C)] 98.3 °F (36.8 °C)  Pulse:  [] 79  Resp:  [16-18] 16  SpO2:  [98 %-99 %] 98 %  BP: ()/(51-61) 99/51     Weight: 56.6 kg (124 lb 12.5 oz)  Body mass index is 21.42 kg/m².      Intake/Output Summary (Last 24 hours) at 7/11/2022 1536  Last data filed at 7/11/2022 0800  Gross per 24 hour   Intake 487.43 ml   Output --   Net 487.43 ml         Physical Exam  Vitals and nursing note reviewed.   HENT:      Head: Normocephalic and atraumatic.      Mouth/Throat:      Mouth: Mucous membranes are moist.   Eyes:      Pupils: Pupils are equal, round, and reactive to light.   Cardiovascular:      Rate and Rhythm: Normal rate and regular rhythm.      Pulses: Normal pulses.      Heart sounds: Normal heart sounds.   Pulmonary:      Effort: Pulmonary effort is  normal.      Breath sounds: Examination of the right-lower field reveals decreased breath sounds and rales. Decreased breath sounds and rales present.       Abdominal:      General: Bowel sounds are normal.      Palpations: Abdomen is soft.   Musculoskeletal:         General: Normal range of motion.      Cervical back: Normal range of motion.   Skin:     General: Skin is warm.   Neurological:      Mental Status: She is alert and oriented to person, place, and time.       Vents:       Lines/Drains/Airways       Peripheral Intravenous Line  Duration                  Midline Catheter Insertion/Assessment  - Single Lumen 07/10/22 1100 Left basilic vein (medial side of arm) 20g x 8cm 1 day                    Significant Labs:    CBC/Anemia Profile:  Recent Labs   Lab 07/09/22 2108   WBC 10.23   HGB 9.6*   HCT 29.9*   *   MCV 77*   RDW 14.6*          Chemistries:  Recent Labs   Lab 07/09/22 2108 07/11/22  1141   *  --    K 3.6  --    CL 92*  --    CO2 26  --    BUN 4*  --    CREATININE 0.6 0.5   CALCIUM 8.8  --    ALBUMIN 2.8*  --    PROT 8.4  --    BILITOT 0.8  --    ALKPHOS 104  --    ALT 27  --    AST 23  --          ABGs: No results for input(s): PH, PCO2, HCO3, POCSATURATED, BE in the last 48 hours.  POCT Glucose: No results for input(s): POCTGLUCOSE in the last 48 hours.  Respiratory Culture:   Recent Labs   Lab 07/09/22  2302   GSRESP <10 epithelial cells per low power field.  Rare WBC's  Rare Gram positive cocci   RESPIRATORYC Normal respiratory elvia     All pertinent labs within the past 24 hours have been reviewed.    Significant Imaging:   I have reviewed all pertinent imaging results/findings within the past 24 hours.    US Upper Extremity Veins Bilateral  Narrative: EXAMINATION:  US UPPER EXTREMITY VEINS BILATERAL    CLINICAL HISTORY:  swelling right upper extremity;    COMPARISON:  None available    FINDINGS:  The deep veins of the bilateral upper extremities are widely patent without  evidence of thrombosis.  Impression: Negative for DVT.    Electronically signed by: Deandre Austin MD  Date:    07/10/2022  Time:    12:25

## 2022-07-11 NOTE — PLAN OF CARE
[facility]  Initial Discharge Assessment       Primary Care Provider: Primary Doctor No    Admission Diagnosis: Cough [R05.9]  Pneumonia [J18.9]  Abscess of lower lobe of right lung with pneumonia [J85.1]    Admission Date: 7/9/2022  Expected Discharge Date:     Discharge Barriers Identified: None    Payor: MEDICAID / Plan: [facility] [employer] [facility] [employer] ([place] MEDICAID) / Product Type: Managed Medicaid /     No emergency contact information on file.    Discharge Plan A: Home  Discharge Plan B: Home      [id number] - [place], [place] - [address]  [address] [place] [address]  Phone: [phone] Fax: [phone]      Initial Assessment (most recent)     Adult Discharge Assessment - 07/11/22 1140        Discharge Assessment    Assessment Type Discharge Planning Assessment     Confirmed/corrected address, phone number and insurance Yes     Confirmed Demographics Correct on Facesheet     Source of Information patient     Communicated CLIFTON with patient/caregiver Date not available/Unable to determine     Reason For Admission Abscess of middle lobe of right lung with pneumonia     Lives With alone     Facility Arrived From: home     Do you expect to return to your current living situation? Yes     Do you have help at home or someone to help you manage your care at home? No     Prior to hospitilization cognitive status: Alert/Oriented     Current cognitive status: Alert/Oriented     Walking or Climbing Stairs Difficulty none     Dressing/Bathing Difficulty none     Home Accessibility wheelchair accessible     Home Layout Able to live on 1st floor     Equipment Currently Used at Home none     Readmission within 30 days? No     Patient currently being followed by outpatient case management? No     Do you currently have service(s) that help you manage your care at home? No     Do you take prescription medications? Yes      Do you have prescription coverage? Yes     Coverage [facility] Medicaid     Do you have any problems affording any of your prescribed medications? No     Is the patient taking medications as prescribed? yes     Who is going to help you get home at discharge? patient will drive herself     How do you get to doctors appointments? car, drives self     Are you on dialysis? No     Do you take coumadin? No     Discharge Plan A Home     Discharge Plan B Home     DME Needed Upon Discharge  none     Discharge Plan discussed with: Patient     Discharge Barriers Identified None               Initial assessment completed with patient. Patient reports independence with ADL's  prior to hospitalization. Patient uses no DME at home. Patient currently has no cm needs upon DC. CM will continue following to assist with other needs.   CM provided information on advanced directives, information on pharmacy bedside delivery, and discharge planning begins on admission with contact information for any needs/questions.

## 2022-07-11 NOTE — PLAN OF CARE
Problem: Adult Inpatient Plan of Care  Goal: Plan of Care Review  Outcome: Ongoing, Progressing  Goal: Patient-Specific Goal (Individualized)  Outcome: Ongoing, Progressing  Goal: Absence of Hospital-Acquired Illness or Injury  Outcome: Ongoing, Progressing  Goal: Optimal Comfort and Wellbeing  Outcome: Ongoing, Progressing  Goal: Readiness for Transition of Care  Outcome: Ongoing, Progressing     Problem: Pain Acute  Goal: Acceptable Pain Control and Functional Ability  Outcome: Ongoing, Progressing     Problem: Infection  Goal: Absence of Infection Signs and Symptoms  Outcome: Ongoing, Progressing

## 2022-07-11 NOTE — PROGRESS NOTES
[facility] Medicine  Progress Note    Patient Name: Carolina Frank  MRN: [id number]  Patient Class: IP- Inpatient   Admission Date: 7/9/2022  Length of Stay: 2 days  Attending Physician: Eusebio Monroy MD  Primary Care Provider: Primary Doctor No      Subjective:     Principal Problem:Abscess of lower lobe of right lung with pneumonia, Abscess left hand      HPI:  Ms. Frank is a 27-year-old  female with no significant medical problems, substance abuse, tobacco user, presented to the ED complaining of for 5-6 days of right-sided pleuritic-type chest discomfort, associated with dry nonproductive cough.  She denies fever, chills.  Not requiring supplemental oxygen.  In the ED she was found to have 5.1 x 2.2 x 4.5 cm cavitary lesion with air-fluid level in the right medial lower lobe, concerning for abscess.  Received IV vancomycin, cefepime in the ED.  afebrile, hemodynamically stable.  UDS positive for amphetamines.    Admitting diagnosis:  Right lung abscess      Overview/Hospital Course:  7/10:  Continue broad-spectrum antibiotics.  Cultures pending.  Patient currently stable on room air.  Plan for midline.  Patient endorses using heroin daily.  Will start  methadone for withdrawal symptoms.  7/11 Left hand abscess. Consult General surgery for possible I&D. Add daily hibiclens bath.      Interval History:  Left hand abscess. Consult General surgery for possible I&D. Add daily hibiclens bath.    Review of Systems   Constitutional:  Positive for appetite change. Negative for activity change, chills, diaphoresis, fatigue and fever.   HENT:  Negative for ear pain, facial swelling, hearing loss and sinus pressure.    Eyes:  Negative for pain and redness.   Respiratory:  Positive for cough. Negative for shortness of breath.    Cardiovascular:  Negative for chest pain, palpitations and leg swelling.   Gastrointestinal:  Negative for abdominal distention, abdominal pain, blood in stool,  constipation, diarrhea, nausea and vomiting.   Endocrine: Negative for polydipsia and polyphagia.   Genitourinary:  Negative for difficulty urinating, dysuria, flank pain and hematuria.   Musculoskeletal:  Negative for gait problem, neck pain and neck stiffness.   Skin:  Negative for color change.   Allergic/Immunologic: Negative for food allergies.   Neurological:  Negative for seizures, facial asymmetry, speech difficulty and weakness.   Hematological:  Does not bruise/bleed easily.   Psychiatric/Behavioral:  Negative for agitation, behavioral problems, confusion, hallucinations and suicidal ideas. The patient is not nervous/anxious.    Objective:     Vital Signs (Most Recent):  Temp: 98.5 °F (36.9 °C) (07/11/22 1636)  Pulse: 71 (07/11/22 1636)  Resp: 17 (07/11/22 1636)  BP: (!) 106/56 (07/11/22 1636)  SpO2: 98 % (07/11/22 1636)   Vital Signs (24h Range):  Temp:  [97.8 °F (36.6 °C)-98.8 °F (37.1 °C)] 98.5 °F (36.9 °C)  Pulse:  [] 71  Resp:  [16-18] 17  SpO2:  [98 %-99 %] 98 %  BP: ()/(51-61) 106/56     Weight: 56.6 kg (124 lb 12.5 oz)  Body mass index is 21.42 kg/m².    Intake/Output Summary (Last 24 hours) at 7/11/2022 1813  Last data filed at 7/11/2022 1200  Gross per 24 hour   Intake 360 ml   Output --   Net 360 ml      Physical Exam  Constitutional:       General: She is not in acute distress.     Appearance: She is well-developed. She is ill-appearing. She is not diaphoretic.   HENT:      Head: Normocephalic and atraumatic.      Mouth/Throat:      Mouth: Mucous membranes are moist.   Eyes:      General:         Right eye: No discharge.         Left eye: No discharge.      Extraocular Movements: Extraocular movements intact.      Conjunctiva/sclera: Conjunctivae normal.      Pupils: Pupils are equal, round, and reactive to light.   Cardiovascular:      Rate and Rhythm: Normal rate and regular rhythm.   Pulmonary:      Effort: Pulmonary effort is normal. No respiratory distress.      Breath sounds:  No rhonchi.   Abdominal:      General: There is no distension.      Tenderness: There is no abdominal tenderness. There is no guarding.   Genitourinary:     Comments: Not examined  Musculoskeletal:         General: Normal range of motion.      Cervical back: Normal range of motion and neck supple. No rigidity or tenderness.      Right lower leg: No edema.      Left lower leg: No edema.      Comments: Right upper extremity edema   Skin:     General: Skin is warm and dry.      Capillary Refill: Capillary refill takes less than 2 seconds.   Neurological:      General: No focal deficit present.      Mental Status: She is alert and oriented to person, place, and time. Mental status is at baseline.      Cranial Nerves: No cranial nerve deficit.   Psychiatric:         Mood and Affect: Mood normal.         Behavior: Behavior normal.         Thought Content: Thought content normal.         Judgment: Judgment normal.     Lab Results   Component Value Date    WBC 10.23 07/09/2022    HGB 9.6 (L) 07/09/2022    HCT 29.9 (L) 07/09/2022    MCV 77 (L) 07/09/2022     (H) 07/09/2022       BMP  Lab Results   Component Value Date     (L) 07/09/2022    K 3.6 07/09/2022    CL 92 (L) 07/09/2022    CO2 26 07/09/2022    BUN 4 (L) 07/09/2022    CREATININE 0.5 07/11/2022    CALCIUM 8.8 07/09/2022    ANIONGAP 16 07/09/2022    ESTGFRAFRICA >60 07/11/2022    EGFRNONAA >60 07/11/2022           Assessment/Plan:      * Abscess of lower lobe of right lung with pneumonia - superior segment  -rule out AFB  -continue IV vancomycin, IV cefepime empirically.  -bronchodilators as needed.  -supplemental oxygen as needed.  -pulmonary consult in a.m..  -keep NPO past midnight.    7/10:  Continue broad-spectrum antibiotics  Pulmonology on case  Sputum cultures pending  Awaiting AFB      Hypoalbuminemia  7/11 Add po supplement      Thrombocytosis  7/11 Monitor      Abscess of left hand  7/11 Consult general surgery for possible I&D  Add daily  hibiclens bath      Hyponatremia  7/11 Monitor      Microcytic anemia  7/11 Add MVI, bid iron, and Pm Vit c      Edema of right upper arm  Awaiting ultrasound to rule out DVT  7/11 Ultrasound negative for DVT      Nicotine dependence  7/09 -nicotine patch  7/11 Smoking cessation education > 3 minutes    Substance abuse- Heroine  -UDS positive for amphetamines    7/10:  UDS positive for Only amphetamines  However patient is reports frequent heroin use  States that she does have withdrawal symptoms whenever she stops using  Start methadone empirically        VTE Risk Mitigation (From admission, onward)         Ordered     Place NIRAV hose  Until discontinued         07/11/22 1818     Place sequential compression device  Until discontinued         07/09/22 2244                Discharge Planning   CLIFTON:      Code Status: Not on file   Is the patient medically ready for discharge?:     Reason for patient still in hospital (select all that apply): Treatment  Discharge Plan A: Home        Time spent seeing patient( greater than 1/2 spent in direct contact) : 38 minutes      GINO Snow  Department of Hospital Medicine   [facility]

## 2022-07-11 NOTE — ASSESSMENT & PLAN NOTE
Sputum Culture  AFB X 3  Quantiferon  Cont Abx: VANC, CEFEPIME , FLAGYL  MRSA screen  Deescalate to UNASYN  regular diet  No pulmonary procedure anticipated today  7/11/22 AFB negative x1, pattern of abscess in superior segment of right lower lobe typical for aspiration pneumonia . Will need long term antibiotics. Malignancy is unlikely

## 2022-07-12 LAB
BACTERIA SPEC AEROBE CULT: NORMAL
BACTERIA SPEC AEROBE CULT: NORMAL
GRAM STN SPEC: NORMAL
HIV1+2 IGG SERPL QL IA.RAPID: NORMAL
VANCOMYCIN TROUGH SERPL-MCNC: 20.2 UG/ML (ref 10–22)

## 2022-07-12 PROCEDURE — 87116 MYCOBACTERIA CULTURE: CPT | Performed by: PHYSICIAN ASSISTANT

## 2022-07-12 PROCEDURE — 99232 SBSQ HOSP IP/OBS MODERATE 35: CPT | Mod: ,,, | Performed by: INTERNAL MEDICINE

## 2022-07-12 PROCEDURE — 87075 CULTR BACTERIA EXCEPT BLOOD: CPT | Performed by: PHYSICIAN ASSISTANT

## 2022-07-12 PROCEDURE — 63600175 PHARM REV CODE 636 W HCPCS: Performed by: INTERNAL MEDICINE

## 2022-07-12 PROCEDURE — 27000207 HC ISOLATION

## 2022-07-12 PROCEDURE — 25000003 PHARM REV CODE 250: Performed by: INTERNAL MEDICINE

## 2022-07-12 PROCEDURE — 80202 ASSAY OF VANCOMYCIN: CPT | Performed by: INTERNAL MEDICINE

## 2022-07-12 PROCEDURE — 87070 CULTURE OTHR SPECIMN AEROBIC: CPT | Performed by: PHYSICIAN ASSISTANT

## 2022-07-12 PROCEDURE — 11000001 HC ACUTE MED/SURG PRIVATE ROOM

## 2022-07-12 PROCEDURE — 10060 I&D ABSCESS SIMPLE/SINGLE: CPT | Mod: LT,,, | Performed by: ORTHOPAEDIC SURGERY

## 2022-07-12 PROCEDURE — 87205 SMEAR GRAM STAIN: CPT | Performed by: PHYSICIAN ASSISTANT

## 2022-07-12 PROCEDURE — 87077 CULTURE AEROBIC IDENTIFY: CPT | Performed by: PHYSICIAN ASSISTANT

## 2022-07-12 PROCEDURE — 99232 PR SUBSEQUENT HOSPITAL CARE,LEVL II: ICD-10-PCS | Mod: ,,, | Performed by: INTERNAL MEDICINE

## 2022-07-12 PROCEDURE — 99223 1ST HOSP IP/OBS HIGH 75: CPT | Mod: 25,,, | Performed by: PHYSICIAN ASSISTANT

## 2022-07-12 PROCEDURE — 87102 FUNGUS ISOLATION CULTURE: CPT | Performed by: PHYSICIAN ASSISTANT

## 2022-07-12 PROCEDURE — 25000003 PHARM REV CODE 250: Performed by: NURSE PRACTITIONER

## 2022-07-12 PROCEDURE — 10060 PR DRAIN SKIN ABSCESS SIMPLE: ICD-10-PCS | Mod: LT,,, | Performed by: ORTHOPAEDIC SURGERY

## 2022-07-12 PROCEDURE — 87015 SPECIMEN INFECT AGNT CONCNTJ: CPT | Performed by: PHYSICIAN ASSISTANT

## 2022-07-12 PROCEDURE — 86635 COCCIDIOIDES ANTIBODY: CPT | Performed by: NURSE PRACTITIONER

## 2022-07-12 PROCEDURE — 87206 SMEAR FLUORESCENT/ACID STAI: CPT | Performed by: PHYSICIAN ASSISTANT

## 2022-07-12 PROCEDURE — 99223 PR INITIAL HOSPITAL CARE,LEVL III: ICD-10-PCS | Mod: 25,,, | Performed by: PHYSICIAN ASSISTANT

## 2022-07-12 PROCEDURE — 86703 HIV-1/HIV-2 1 RESULT ANTBDY: CPT | Performed by: NURSE PRACTITIONER

## 2022-07-12 PROCEDURE — 25000003 PHARM REV CODE 250: Performed by: FAMILY MEDICINE

## 2022-07-12 PROCEDURE — 86480 TB TEST CELL IMMUN MEASURE: CPT | Performed by: NURSE PRACTITIONER

## 2022-07-12 PROCEDURE — 87556 M.TUBERCULO DNA AMP PROBE: CPT | Performed by: NURSE PRACTITIONER

## 2022-07-12 PROCEDURE — 87186 SC STD MICRODIL/AGAR DIL: CPT | Performed by: PHYSICIAN ASSISTANT

## 2022-07-12 RX ORDER — LIDOCAINE HYDROCHLORIDE AND EPINEPHRINE 10; 10 MG/ML; UG/ML
10 INJECTION, SOLUTION INFILTRATION; PERINEURAL ONCE
Status: DISCONTINUED | OUTPATIENT
Start: 2022-07-12 | End: 2022-07-12

## 2022-07-12 RX ORDER — LORAZEPAM 0.5 MG/1
0.5 TABLET ORAL ONCE
Status: COMPLETED | OUTPATIENT
Start: 2022-07-12 | End: 2022-07-12

## 2022-07-12 RX ORDER — LORAZEPAM 0.5 MG/1
0.5 TABLET ORAL 3 TIMES DAILY
Status: DISCONTINUED | OUTPATIENT
Start: 2022-07-12 | End: 2022-07-14 | Stop reason: HOSPADM

## 2022-07-12 RX ADMIN — METRONIDAZOLE 500 MG: 500 TABLET ORAL at 06:07

## 2022-07-12 RX ADMIN — VANCOMYCIN HYDROCHLORIDE 750 MG: 750 INJECTION, POWDER, LYOPHILIZED, FOR SOLUTION INTRAVENOUS at 04:07

## 2022-07-12 RX ADMIN — Medication 324 MG: at 06:07

## 2022-07-12 RX ADMIN — OXYCODONE HYDROCHLORIDE AND ACETAMINOPHEN 500 MG: 500 TABLET ORAL at 09:07

## 2022-07-12 RX ADMIN — CEFEPIME 2 G: 2 INJECTION, POWDER, FOR SOLUTION INTRAVENOUS at 05:07

## 2022-07-12 RX ADMIN — OXYCODONE AND ACETAMINOPHEN 1 TABLET: 10; 325 TABLET ORAL at 02:07

## 2022-07-12 RX ADMIN — Medication 324 MG: at 10:07

## 2022-07-12 RX ADMIN — LORAZEPAM 0.5 MG: 0.5 TABLET ORAL at 11:07

## 2022-07-12 RX ADMIN — LIDOCAINE HYDROCHLORIDE 10 ML: 10; .005 INJECTION, SOLUTION EPIDURAL; INFILTRATION; INTRACAUDAL; PERINEURAL at 01:07

## 2022-07-12 RX ADMIN — VANCOMYCIN HYDROCHLORIDE 1000 MG: 1 INJECTION, POWDER, FOR SOLUTION INTRAVENOUS at 06:07

## 2022-07-12 RX ADMIN — METHADONE HYDROCHLORIDE 10 MG: 10 TABLET ORAL at 10:07

## 2022-07-12 RX ADMIN — THERA TABS 1 TABLET: TAB at 10:07

## 2022-07-12 RX ADMIN — VANCOMYCIN HYDROCHLORIDE 750 MG: 750 INJECTION, POWDER, LYOPHILIZED, FOR SOLUTION INTRAVENOUS at 11:07

## 2022-07-12 RX ADMIN — LORAZEPAM 0.5 MG: 0.5 TABLET ORAL at 06:07

## 2022-07-12 RX ADMIN — METRONIDAZOLE 500 MG: 500 TABLET ORAL at 11:07

## 2022-07-12 RX ADMIN — CEFEPIME 2 G: 2 INJECTION, POWDER, FOR SOLUTION INTRAVENOUS at 02:07

## 2022-07-12 RX ADMIN — CEFEPIME 2 G: 2 INJECTION, POWDER, FOR SOLUTION INTRAVENOUS at 08:07

## 2022-07-12 RX ADMIN — OXYCODONE AND ACETAMINOPHEN 1 TABLET: 10; 325 TABLET ORAL at 09:07

## 2022-07-12 RX ADMIN — OXYCODONE AND ACETAMINOPHEN 1 TABLET: 10; 325 TABLET ORAL at 06:07

## 2022-07-12 NOTE — PROGRESS NOTES
[facility]  Progress Note    Patient Name: Carolina Frank  MRN: [id number]  Patient Class: IP- Inpatient   Admission Date: 7/9/2022  Length of Stay: 3 days  Attending Physician: Eusebio Monroy MD  Primary Care Provider: Primary Doctor No      Subjective:     Principal Problem:Abscess of lower lobe of right lung with pneumonia, Abscess left hand      HPI:  Ms. Frank is a 27-year-old  female with no significant medical problems, substance abuse, tobacco user, presented to the ED complaining of for 5-6 days of right-sided pleuritic-type chest discomfort, associated with dry nonproductive cough.  She denies fever, chills.  Not requiring supplemental oxygen.  In the ED she was found to have 5.1 x 2.2 x 4.5 cm cavitary lesion with air-fluid level in the right medial lower lobe, concerning for abscess.  Received IV vancomycin, cefepime in the ED.  afebrile, hemodynamically stable.  UDS positive for amphetamines.    Admitting diagnosis:  Right lung abscess      Overview/Hospital Course:  7/10:  Continue broad-spectrum antibiotics.  Cultures pending.  Patient currently stable on room air.  Plan for midline.  Patient endorses using heroin daily.  Will start  methadone for withdrawal symptoms.  7/11 Left hand abscess. Consult General surgery for possible I&D. Add daily hibiclens bath.  7/12 Ortho consulted for I&D of left hand abscess. Add ativan for anxiety.       Interval History:  Ortho consulted for I&D of left hand abscess. Add ativan for anxiety.     Review of Systems   Constitutional:  Positive for appetite change. Negative for activity change, chills, diaphoresis, fatigue and fever.   HENT:  Negative for ear pain, facial swelling, hearing loss and sinus pressure.    Eyes:  Negative for pain and redness.   Respiratory:  Positive for cough. Negative for shortness of breath.    Cardiovascular:  Negative for chest pain, palpitations and leg swelling.   Gastrointestinal:  Negative for  abdominal distention, abdominal pain, blood in stool, constipation, diarrhea, nausea and vomiting.   Endocrine: Negative for polydipsia and polyphagia.   Genitourinary:  Negative for difficulty urinating, dysuria, flank pain and hematuria.   Musculoskeletal:  Negative for gait problem, neck pain and neck stiffness.   Skin:  Negative for color change.   Allergic/Immunologic: Negative for food allergies.   Neurological:  Negative for seizures, facial asymmetry, speech difficulty and weakness.   Hematological:  Does not bruise/bleed easily.   Psychiatric/Behavioral:  Negative for agitation, behavioral problems, confusion, hallucinations and suicidal ideas. The patient is not nervous/anxious.         Anxious   Objective:     Vital Signs (Most Recent):  Temp: 97.7 °F (36.5 °C) (07/12/22 1157)  Pulse: 94 (07/12/22 1157)  Resp: 17 (07/12/22 1447)  BP:  (patient refuse after 4th attempt) (07/12/22 1157)  SpO2: 99 % (07/12/22 1157) Vital Signs (24h Range):  Temp:  [97.7 °F (36.5 °C)-98.4 °F (36.9 °C)] 97.7 °F (36.5 °C)  Pulse:  [67-94] 94  Resp:  [14-20] 17  SpO2:  [97 %-99 %] 99 %  BP: (110-126)/(61-69) 114/63     Weight: 56.6 kg (124 lb 12.5 oz)  Body mass index is 21.42 kg/m².    Intake/Output Summary (Last 24 hours) at 7/12/2022 1803  Last data filed at 7/12/2022 1200  Gross per 24 hour   Intake 995.48 ml   Output --   Net 995.48 ml      Physical Exam  Constitutional:       General: She is not in acute distress.     Appearance: She is well-developed. She is ill-appearing. She is not diaphoretic.   HENT:      Head: Normocephalic and atraumatic.      Mouth/Throat:      Mouth: Mucous membranes are moist.   Eyes:      General:         Right eye: No discharge.         Left eye: No discharge.      Extraocular Movements: Extraocular movements intact.      Conjunctiva/sclera: Conjunctivae normal.      Pupils: Pupils are equal, round, and reactive to light.   Cardiovascular:      Rate and Rhythm: Normal rate and regular rhythm.    Pulmonary:      Effort: Pulmonary effort is normal. No respiratory distress.      Breath sounds: No rhonchi.   Abdominal:      General: There is no distension.      Tenderness: There is no abdominal tenderness. There is no guarding.   Genitourinary:     Comments: Not examined  Musculoskeletal:         General: Normal range of motion.      Cervical back: Normal range of motion and neck supple. No rigidity or tenderness.      Right lower leg: No edema.      Left lower leg: No edema.      Comments: Right upper extremity edema   Skin:     General: Skin is warm and dry. Dressing left hand     Capillary Refill: Capillary refill takes less than 2 seconds.   Neurological:      General: No focal deficit present.      Mental Status: She is alert and oriented to person, place, and time. Mental status is at baseline.      Cranial Nerves: No cranial nerve deficit.   Psychiatric:         Mood and Affect: Mood normal. Anxious        Behavior: Behavior normal.         Thought Content: Thought content normal.         Judgment: Judgment normal.       Lab Results   Component Value Date    WBC 10.23 07/09/2022    HGB 9.6 (L) 07/09/2022    HCT 29.9 (L) 07/09/2022    MCV 77 (L) 07/09/2022     (H) 07/09/2022       BMP  Lab Results   Component Value Date     (L) 07/09/2022    K 3.6 07/09/2022    CL 92 (L) 07/09/2022    CO2 26 07/09/2022    BUN 4 (L) 07/09/2022    CREATININE 0.5 07/11/2022    CALCIUM 8.8 07/09/2022    ANIONGAP 16 07/09/2022    ESTGFRAFRICA >60 07/11/2022    EGFRNONAA >60 07/11/2022           Assessment/Plan:      * Abscess of lower lobe of right lung with pneumonia - superior segment  -rule out AFB  -continue IV vancomycin, IV cefepime empirically.  -bronchodilators as needed.  -supplemental oxygen as needed.  -pulmonary consult in a.m..  -keep NPO past midnight.    7/10:  Continue broad-spectrum antibiotics  Pulmonology on case  Sputum cultures pending  Awaiting AFB    7/12 AFB from 7/09 showed    Culture in  progress      AFB CULTURE STAIN No acid fast bacilli seen.           Hypoalbuminemia  7/11 Add po supplement      Thrombocytosis  7/11 Monitor      Abscess of left hand  7/11 Consult general surgery for possible I&D  Add daily hibiclens bath  7/12 Ortho consulted  Patient had I&D of left hand today- Culture sent to lab      Hyponatremia  7/11 Monitor      Microcytic anemia  7/11 Add MVI, bid iron, and Pm Vit c  7/12 Monitor      Edema of right upper arm  Awaiting ultrasound to rule out DVT  7/11 Ultrasound negative for DVT      Nicotine dependence  7/09 -nicotine patch  7/11 Smoking cessation education > 3 minutes    Substance abuse- Heroine  -UDS positive for amphetamines    7/10:  UDS positive for Only amphetamines  However patient is reports frequent heroin use  States that she does have withdrawal symptoms whenever she stops using  Start methadone empirically    7/12 Continue methadone        VTE Risk Mitigation (From admission, onward)         Ordered     Place NIRAV hose  Until discontinued         07/11/22 1818     Place sequential compression device  Until discontinued         07/09/22 2244                Discharge Planning   CLIFTON:      Code Status: Not on file   Is the patient medically ready for discharge?:     Reason for patient still in hospital (select all that apply): Treatment  Discharge Plan A: Home        Time spent seeing patient( greater than 1/2 spent in direct contact) :  38 minutes      GINO Snow  Department of Hospital Medicine   [facility] - Med Surg

## 2022-07-12 NOTE — SUBJECTIVE & OBJECTIVE
Interval History:  Ortho consulted for I&D of left hand abscess. Add ativan for anxiety.     Review of Systems   Constitutional:  Positive for appetite change. Negative for activity change, chills, diaphoresis, fatigue and fever.   HENT:  Negative for ear pain, facial swelling, hearing loss and sinus pressure.    Eyes:  Negative for pain and redness.   Respiratory:  Positive for cough. Negative for shortness of breath.    Cardiovascular:  Negative for chest pain, palpitations and leg swelling.   Gastrointestinal:  Negative for abdominal distention, abdominal pain, blood in stool, constipation, diarrhea, nausea and vomiting.   Endocrine: Negative for polydipsia and polyphagia.   Genitourinary:  Negative for difficulty urinating, dysuria, flank pain and hematuria.   Musculoskeletal:  Negative for gait problem, neck pain and neck stiffness.   Skin:  Negative for color change.   Allergic/Immunologic: Negative for food allergies.   Neurological:  Negative for seizures, facial asymmetry, speech difficulty and weakness.   Hematological:  Does not bruise/bleed easily.   Psychiatric/Behavioral:  Negative for agitation, behavioral problems, confusion, hallucinations and suicidal ideas. The patient is not nervous/anxious.         Anxious   Objective:     Vital Signs (Most Recent):  Temp: 97.7 °F (36.5 °C) (07/12/22 1157)  Pulse: 94 (07/12/22 1157)  Resp: 17 (07/12/22 1447)  BP:  (patient refuse after 4th attempt) (07/12/22 1157)  SpO2: 99 % (07/12/22 1157) Vital Signs (24h Range):  Temp:  [97.7 °F (36.5 °C)-98.4 °F (36.9 °C)] 97.7 °F (36.5 °C)  Pulse:  [67-94] 94  Resp:  [14-20] 17  SpO2:  [97 %-99 %] 99 %  BP: (110-126)/(61-69) 114/63     Weight: 56.6 kg (124 lb 12.5 oz)  Body mass index is 21.42 kg/m².    Intake/Output Summary (Last 24 hours) at 7/12/2022 1803  Last data filed at 7/12/2022 1200  Gross per 24 hour   Intake 995.48 ml   Output --   Net 995.48 ml      Physical Exam  Constitutional:       General: She is not in  acute distress.     Appearance: She is well-developed. She is ill-appearing. She is not diaphoretic.   HENT:      Head: Normocephalic and atraumatic.      Mouth/Throat:      Mouth: Mucous membranes are moist.   Eyes:      General:         Right eye: No discharge.         Left eye: No discharge.      Extraocular Movements: Extraocular movements intact.      Conjunctiva/sclera: Conjunctivae normal.      Pupils: Pupils are equal, round, and reactive to light.   Cardiovascular:      Rate and Rhythm: Normal rate and regular rhythm.   Pulmonary:      Effort: Pulmonary effort is normal. No respiratory distress.      Breath sounds: No rhonchi.   Abdominal:      General: There is no distension.      Tenderness: There is no abdominal tenderness. There is no guarding.   Genitourinary:     Comments: Not examined  Musculoskeletal:         General: Normal range of motion.      Cervical back: Normal range of motion and neck supple. No rigidity or tenderness.      Right lower leg: No edema.      Left lower leg: No edema.      Comments: Right upper extremity edema   Skin:     General: Skin is warm and dry.      Capillary Refill: Capillary refill takes less than 2 seconds.   Neurological:      General: No focal deficit present.      Mental Status: She is alert and oriented to person, place, and time. Mental status is at baseline.      Cranial Nerves: No cranial nerve deficit.   Psychiatric:         Mood and Affect: Mood normal.         Behavior: Behavior normal.         Thought Content: Thought content normal.         Judgment: Judgment normal.       Lab Results   Component Value Date    WBC 10.23 07/09/2022    HGB 9.6 (L) 07/09/2022    HCT 29.9 (L) 07/09/2022    MCV 77 (L) 07/09/2022     (H) 07/09/2022       BMP  Lab Results   Component Value Date     (L) 07/09/2022    K 3.6 07/09/2022    CL 92 (L) 07/09/2022    CO2 26 07/09/2022    BUN 4 (L) 07/09/2022    CREATININE 0.5 07/11/2022    CALCIUM 8.8 07/09/2022    ANIONGAP  16 07/09/2022    ESTGFRAFRICA >60 07/11/2022    EGFRNONAA >60 07/11/2022

## 2022-07-12 NOTE — SUBJECTIVE & OBJECTIVE
Past Medical History:   Diagnosis Date    Overdose of illicit drug        History reviewed. No pertinent surgical history.    Review of patient's allergies indicates:  No Known Allergies    Family History    None       Tobacco Use    Smoking status: Current Every Day Smoker     Types: Cigarettes    Smokeless tobacco: Never Used   Substance and Sexual Activity    Alcohol use: Not Currently    Drug use: Not Currently    Sexual activity: Yes     Partners: Male     Birth control/protection: Injection         Review of Systems   Constitutional:  Positive for chills, fatigue and fever.   Eyes: Negative.    Respiratory:  Positive for cough.    Cardiovascular: Negative.    Gastrointestinal: Negative.    Endocrine: Negative.    Genitourinary: Negative.    Musculoskeletal: Negative.    Allergic/Immunologic: Negative.    Neurological: Negative.    Hematological: Negative.    Psychiatric/Behavioral: Negative.     Objective:     Vital Signs (Most Recent):  Temp: 97.7 °F (36.5 °C) (07/12/22 1157)  Pulse: 94 (07/12/22 1157)  Resp: 17 (07/12/22 1447)  BP:  (patient refuse after 4th attempt) (07/12/22 1157)  SpO2: 99 % (07/12/22 1157)   Vital Signs (24h Range):  Temp:  [97.7 °F (36.5 °C)-98.4 °F (36.9 °C)] 97.7 °F (36.5 °C)  Pulse:  [67-94] 94  Resp:  [14-20] 17  SpO2:  [97 %-99 %] 99 %  BP: (110-126)/(61-69) 114/63     Weight: 56.6 kg (124 lb 12.5 oz)  Body mass index is 21.42 kg/m².      Intake/Output Summary (Last 24 hours) at 7/12/2022 7299  Last data filed at 7/12/2022 1200  Gross per 24 hour   Intake 1115.48 ml   Output --   Net 1115.48 ml         Physical Exam  Vitals and nursing note reviewed.   HENT:      Head: Normocephalic and atraumatic.      Mouth/Throat:      Mouth: Mucous membranes are moist.   Eyes:      Pupils: Pupils are equal, round, and reactive to light.   Cardiovascular:      Rate and Rhythm: Normal rate and regular rhythm.      Pulses: Normal pulses.      Heart sounds: Normal heart sounds.   Pulmonary:       Effort: Pulmonary effort is normal.      Breath sounds: Examination of the right-lower field reveals decreased breath sounds and rales. Decreased breath sounds and rales present.       Abdominal:      General: Bowel sounds are normal.      Palpations: Abdomen is soft.   Musculoskeletal:         General: Swelling present. Normal range of motion.      Cervical back: Normal range of motion.      Comments: Left arm - bandage   Skin:     General: Skin is warm.   Neurological:      Mental Status: She is alert and oriented to person, place, and time.       Vents:       Lines/Drains/Airways       Peripheral Intravenous Line  Duration                  Midline Catheter Insertion/Assessment  - Single Lumen 07/10/22 1100 Left basilic vein (medial side of arm) 20g x 8cm 2 days                    Significant Labs:    CBC/Anemia Profile:  No results for input(s): WBC, HGB, HCT, PLT, MCV, RDW, IRON, FERRITIN, RETIC, FOLATE, [id number], OCCULTBLOOD in the last 48 hours.       Chemistries:  Recent Labs   Lab 07/11/22  1141   CREATININE 0.5         ABGs: No results for input(s): PH, PCO2, HCO3, POCSATURATED, BE in the last 48 hours.  POCT Glucose: No results for input(s): POCTGLUCOSE in the last 48 hours.  Respiratory Culture: No results for input(s): GSRESP, RESPIRATORYC in the last 48 hours.    All pertinent labs within the past 24 hours have been reviewed.    Significant Imaging:   I have reviewed all pertinent imaging results/findings within the past 24 hours.    US Upper Extremity Veins Bilateral  Narrative: EXAMINATION:  US UPPER EXTREMITY VEINS BILATERAL    CLINICAL HISTORY:  swelling right upper extremity;    COMPARISON:  None available    FINDINGS:  The deep veins of the bilateral upper extremities are widely patent without evidence of thrombosis.  Impression: Negative for DVT.    Electronically signed by: Deandre Austin MD  Date:    07/10/2022  Time:    12:25

## 2022-07-12 NOTE — PROGRESS NOTES
procedure note:    IV drug abuser with abscess at the dorsal aspect of the left hand, overlying the index metacarpal neck region.    The need for incision and drainage, the mechanism of the procedure, expected recovery were explained to the patient and she gave informed consent.    The left hand was then sterilely prepped with chlorhexidine.  7 cc of 1% lidocaine with epinephrine were then used to block the area proximal to the abscess over the index metacarpal base extending to the web space.  The hand was then sterilely draped and re-prepped.  Adequate anesthesia was confirmed.  Using an 11 blade scalpel a 1 cm incision was made down through skin into the abscess cavity.  Approximately 2 cc of thick purulent material were expressed.  The abscess cavity was then packed with iodoform gauze and sterile dressing applied.    The patient tolerated the procedure satisfactorily.  Plan to have wound care see the patient in 48 hours to remove packing and continue with wound management.

## 2022-07-12 NOTE — PROGRESS NOTES
[facility]  Pulmonology  Progress Note    Patient Name: Carolina Frank  MRN: [id number]  Admission Date: 7/9/2022  Hospital Length of Stay: 3 days  Code Status: No Order  Attending Provider: Eusebio Monroy MD  Primary Care Provider: Primary Doctor No   Principal Problem: Abscess of lower lobe of right lung with pneumonia    Subjective: wants to go home     Past Medical History:   Diagnosis Date    Overdose of illicit drug        History reviewed. No pertinent surgical history.    Review of patient's allergies indicates:  No Known Allergies    Family History    None       Tobacco Use    Smoking status: Current Every Day Smoker     Types: Cigarettes    Smokeless tobacco: Never Used   Substance and Sexual Activity    Alcohol use: Not Currently    Drug use: Not Currently    Sexual activity: Yes     Partners: Male     Birth control/protection: Injection         Review of Systems   Constitutional:  Positive for chills, fatigue and fever.   Eyes: Negative.    Respiratory:  Positive for cough.    Cardiovascular: Negative.    Gastrointestinal: Negative.    Endocrine: Negative.    Genitourinary: Negative.    Musculoskeletal: Negative.    Allergic/Immunologic: Negative.    Neurological: Negative.    Hematological: Negative.    Psychiatric/Behavioral: Negative.     Objective:     Vital Signs (Most Recent):  Temp: 97.7 °F (36.5 °C) (07/12/22 1157)  Pulse: 94 (07/12/22 1157)  Resp: 17 (07/12/22 1447)  BP:  (patient refuse after 4th attempt) (07/12/22 1157)  SpO2: 99 % (07/12/22 1157)   Vital Signs (24h Range):  Temp:  [97.7 °F (36.5 °C)-98.4 °F (36.9 °C)] 97.7 °F (36.5 °C)  Pulse:  [67-94] 94  Resp:  [14-20] 17  SpO2:  [97 %-99 %] 99 %  BP: (110-126)/(61-69) 114/63     Weight: 56.6 kg (124 lb 12.5 oz)  Body mass index is 21.42 kg/m².      Intake/Output Summary (Last 24 hours) at 7/12/2022 3500  Last data filed at 7/12/2022 1200  Gross per 24 hour   Intake 1115.48 ml   Output --   Net 1115.48 ml         Physical  Exam  Vitals and nursing note reviewed.   HENT:      Head: Normocephalic and atraumatic.      Mouth/Throat:      Mouth: Mucous membranes are moist.   Eyes:      Pupils: Pupils are equal, round, and reactive to light.   Cardiovascular:      Rate and Rhythm: Normal rate and regular rhythm.      Pulses: Normal pulses.      Heart sounds: Normal heart sounds.   Pulmonary:      Effort: Pulmonary effort is normal.      Breath sounds: Examination of the right-lower field reveals decreased breath sounds and rales. Decreased breath sounds and rales present.       Abdominal:      General: Bowel sounds are normal.      Palpations: Abdomen is soft.   Musculoskeletal:         General: Swelling present. Normal range of motion.      Cervical back: Normal range of motion.      Comments: Left arm - bandage   Skin:     General: Skin is warm.   Neurological:      Mental Status: She is alert and oriented to person, place, and time.       Vents:       Lines/Drains/Airways       Peripheral Intravenous Line  Duration                  Midline Catheter Insertion/Assessment  - Single Lumen 07/10/22 1100 Left basilic vein (medial side of arm) 20g x 8cm 2 days                    Significant Labs:    CBC/Anemia Profile:  No results for input(s): WBC, HGB, HCT, PLT, MCV, RDW, IRON, FERRITIN, RETIC, FOLATE, [id number], OCCULTBLOOD in the last 48 hours.       Chemistries:  Recent Labs   Lab 07/11/22  1141   CREATININE 0.5         ABGs: No results for input(s): PH, PCO2, HCO3, POCSATURATED, BE in the last 48 hours.  POCT Glucose: No results for input(s): POCTGLUCOSE in the last 48 hours.  Respiratory Culture: No results for input(s): GSRESP, RESPIRATORYC in the last 48 hours.    All pertinent labs within the past 24 hours have been reviewed.    Significant Imaging:   I have reviewed all pertinent imaging results/findings within the past 24 hours.    US Upper Extremity Veins Bilateral  Narrative: EXAMINATION:  US UPPER EXTREMITY VEINS  BILATERAL    CLINICAL HISTORY:  swelling right upper extremity;    COMPARISON:  None available    FINDINGS:  The deep veins of the bilateral upper extremities are widely patent without evidence of thrombosis.  Impression: Negative for DVT.    Electronically signed by: Deandre Austin MD  Date:    07/10/2022  Time:    12:25       ABG  No results for input(s): PH, PO2, PCO2, HCO3, BE in the last 168 hours.  Assessment/Plan:     * Abscess of lower lobe of right lung with pneumonia - superior segment  Sputum Culture  AFB X 3  Quantiferon  Cont Abx: VANC, CEFEPIME , FLAGYL  MRSA screen  Deescalate to UNASYN  regular diet  No pulmonary procedure anticipated today  7/11/22 AFB negative x1, pattern of abscess in superior segment of right lower lobe typical for aspiration pneumonia . Will need long term antibiotics. Malignancy is unlikely   7/12 Check CT of chest for progress           Buzz Tilley MD  Pulmonology  [facility]

## 2022-07-12 NOTE — ASSESSMENT & PLAN NOTE
-rule out AFB  -continue IV vancomycin, IV cefepime empirically.  -bronchodilators as needed.  -supplemental oxygen as needed.  -pulmonary consult in a.m..  -keep NPO past midnight.    7/10:  Continue broad-spectrum antibiotics  Pulmonology on case  Sputum cultures pending  Awaiting AFB    7/12 AFB from 7/09 showed    Culture in progress      AFB CULTURE STAIN No acid fast bacilli seen.

## 2022-07-12 NOTE — ASSESSMENT & PLAN NOTE
Sputum Culture  AFB X 3  Quantiferon  Cont Abx: VANC, CEFEPIME , FLAGYL  MRSA screen  Deescalate to UNASYN  regular diet  No pulmonary procedure anticipated today  7/11/22 AFB negative x1, pattern of abscess in superior segment of right lower lobe typical for aspiration pneumonia . Will need long term antibiotics. Malignancy is unlikely   7/12 Check CT of chest for progress

## 2022-07-12 NOTE — ASSESSMENT & PLAN NOTE
-UDS positive for amphetamines    7/10:  UDS positive for Only amphetamines  However patient is reports frequent heroin use  States that she does have withdrawal symptoms whenever she stops using  Start methadone empirically    7/12 Continue methadone

## 2022-07-12 NOTE — CONSULTS
[facility]  Orthopedics  Consult Note    Patient Name: Carolina Frank  MRN: [id number]  Admission Date: 7/9/2022  Hospital Length of Stay: 3 days  Attending Provider: Eusebio Monroy MD  Primary Care Provider: Primary Doctor No    Patient information was obtained from patient, ER records and primary team.     Consults  Subjective:     Principal Problem:Abscess of lower lobe of right lung with pneumonia    Chief Complaint:   Chief Complaint   Patient presents with    Cough     Cough, productive cough since last Tuesday, right sided rib pain worse when coughing, requesting 'xray of lung'        HPI: Carolina Frank is a 27-year-old female with history of IV drug abuse and hepatitis-C who is admitted for abscess of lower lobe of right lung currently being isolated pending rule out of tuberculosis.  She has an abscess of the dorsum of the left hand for which we have been consulted.  Patient reports the abscess started a day or 2 ago and has acutely worsened.  She denies any joint pain.    Past Medical History:   Diagnosis Date    Overdose of illicit drug        History reviewed. No pertinent surgical history.    Review of patient's allergies indicates:  No Known Allergies    Current Facility-Administered Medications   Medication    acetaminophen tablet 650 mg    albuterol-ipratropium 2.5 mg-0.5 mg/3 mL nebulizer solution 3 mL    aluminum-magnesium hydroxide-simethicone 200-200-20 mg/5 mL suspension 30 mL    ascorbic acid (vitamin C) tablet 500 mg    cefepime in dextrose 5 % IVPB 2 g    docusate sodium capsule 100 mg    ferrous gluconate tablet 324 mg    guaiFENesin 100 mg/5 ml syrup 200 mg    melatonin tablet 6 mg    methadone tablet 10 mg    metroNIDAZOLE tablet 500 mg    multivitamin tablet    nicotine 21 mg/24 hr 1 patch    ondansetron injection 4 mg    oxyCODONE-acetaminophen  mg per tablet 1 tablet    oxyCODONE-acetaminophen 5-325 mg per tablet 1 tablet    vancomycin - pharmacy to  "dose    vancomycin in dextrose 5 % 1 gram/250 mL IVPB 1,000 mg     Family History    None       Tobacco Use    Smoking status: Current Every Day Smoker     Types: Cigarettes    Smokeless tobacco: Never Used   Substance and Sexual Activity    Alcohol use: Not Currently    Drug use: Not Currently    Sexual activity: Yes     Partners: Male     Birth control/protection: Injection     ROS  Objective:     Vital Signs (Most Recent):  Temp: 98.4 °F (36.9 °C) (07/12/22 0809)  Pulse: 70 (07/12/22 0809)  Resp: 17 (07/12/22 0809)  BP: 114/63 (07/12/22 0809)  SpO2: 97 % (07/12/22 0809) Vital Signs (24h Range):  Temp:  [98.1 °F (36.7 °C)-98.5 °F (36.9 °C)] 98.4 °F (36.9 °C)  Pulse:  [67-79] 70  Resp:  [16-20] 17  SpO2:  [97 %-99 %] 97 %  BP: ()/(51-69) 114/63     Weight: 56.6 kg (124 lb 12.5 oz)  Height: 5' 4" (162.6 cm)  Body mass index is 21.42 kg/m².      Intake/Output Summary (Last 24 hours) at 7/12/2022 1029  Last data filed at 7/11/2022 1914  Gross per 24 hour   Intake 935.48 ml   Output --   Net 935.48 ml       Ortho/SPM Exam   Left hand:  Abscess of the dorsum of the left hand just radial to the 2nd metacarpophalangeal joint  Fluctuance noted on palpation  Moderate edema and erythema surrounding  ROM is full and without pain  Motor exam normal  Sensation and pulses intact  Cap refill brisk    GEN: Well developed, well nourished female. AAOX3. No acute distress.   Normocephalic, atraumatic.   DONALD  Breathing unlabored.  Mood and affect appropriate.      Significant Labs: All pertinent labs within the past 24 hours have been reviewed.    Significant Imaging: I have reviewed and interpreted all pertinent imaging results/findings.    Assessment/Plan:     Active Diagnoses:    Diagnosis Date Noted POA    PRINCIPAL PROBLEM:  Abscess of lower lobe of right lung with pneumonia - superior segment [J85.1] 07/09/2022 Yes    Microcytic anemia [D50.9] 07/11/2022 Yes    Hyponatremia [E87.1] 07/11/2022 Yes    Abscess of " left hand [L02.512] 07/11/2022 Yes    Thrombocytosis [D75.839] 07/11/2022 Yes    Hypoalbuminemia [E88.09] 07/11/2022 Yes    Edema of right upper arm [R60.0] 07/10/2022 No    Substance abuse- Heroine [F19.10] 07/09/2022 Yes    Nicotine dependence [F17.200] 07/09/2022 Yes      Problems Resolved During this Admission:     Assessment:  27-year-old female with abscess of lower lobe of right lung currently being isolated pending rule out of tuberculosis and abscess the dorsum of the left hand    Plan:  Discussed treatment options with the patient  Recommended incision and drainage which could be performed in the operating room or a bedside.  Patient said she would like to avoid going to the operating room if possible.  We will plan for bedside I and D this afternoon    Thank you for your consult. I will follow-up with patient. Please contact us if you have any additional questions.    Nando Morris PA-C  Orthopedics  [facility]

## 2022-07-12 NOTE — PROGRESS NOTES
Pharmacokinetic Assessment Follow Up: IV Vancomycin    Vancomycin serum concentration assessment(s):    The trough level was drawn correctly and can be used to guide therapy at this time. The measurement is within the desired definitive target range of 15 to 20 mcg/mL.    Vancomycin Regimen Plan:    Change regimen to Vancomycin 750 mg IV every 8 hours with next serum trough concentration measured at 0730 prior to 3rd dose on 7/13    Drug levels (last 3 results):  Recent Labs   Lab Result Units 07/11/22  1141 07/12/22  1407   Vancomycin-Trough ug/mL 3.8* 20.2       Pharmacy will continue to follow and monitor vancomycin.    Please contact pharmacy at extension [phone] for questions regarding this assessment.    Thank you for the consult,   So Rios       Patient brief summary:  Carolina Frank is a 27 y.o. female initiated on antimicrobial therapy with IV Vancomycin for treatment of lower respiratory infection    The patient's current regimen is 750mg every 8 hours    Drug Allergies:   Review of patient's allergies indicates:  No Known Allergies    Actual Body Weight:   56.6kg    Renal Function:   Estimated Creatinine Clearance: 147.2 mL/min (based on SCr of 0.5 mg/dL).,     Dialysis Method (if applicable):  N/A    CBC (last 72 hours):  Recent Labs   Lab Result Units 07/09/22 2108   WBC K/uL 10.23   Hemoglobin g/dL 9.6*   Hematocrit % 29.9*   Platelets K/uL 503*   Gran % % 65.8   Lymph % % 21.6   Mono % % 11.3   Eosinophil % % 0.6   Basophil % % 0.2   Differential Method  Automated       Metabolic Panel (last 72 hours):  Recent Labs   Lab Result Units 07/09/22  2103 07/09/22  2108 07/11/22  1141   Sodium mmol/L  --  134*  --    Potassium mmol/L  --  3.6  --    Chloride mmol/L  --  92*  --    CO2 mmol/L  --  26  --    Glucose mg/dL  --  90  --    BUN mg/dL  --  4*  --    Creatinine mg/dL  --  0.6 0.5   Creatinine, Urine mg/dL 181.0  --   --    Albumin g/dL  --  2.8*  --    Total Bilirubin mg/dL  --  0.8  --     Alkaline Phosphatase U/L  --  104  --    AST U/L  --  23  --    ALT U/L  --  27  --        Vancomycin Administrations:  vancomycin given in the last 96 hours                     vancomycin in dextrose 5 % 1 gram/250 mL IVPB 1,000 mg (mg) 1,000 mg New Bag 07/12/22 0606     1,000 mg New Bag 07/11/22 2258    vancomycin in dextrose 5 % 1 gram/250 mL IVPB 1,000 mg (mg) 1,000 mg New Bag 07/11/22 1408     1,000 mg New Bag 07/10/22 2337      Restarted  1310     1,000 mg New Bag  1209    vancomycin 1.5 g in dextrose 5 % 250 mL IVPB (ready to mix) (mg) 1,500 mg New Bag 07/10/22 0033                    Microbiologic Results:  Microbiology Results (last 7 days)       Procedure Component Value Units Date/Time    Gram stain [[id number]] Collected: 07/12/22 1252    Order Status: Sent Specimen: Abscess from Hand, Left Updated: 07/12/22 1345    Culture, Anaerobe [[id number]] Collected: 07/12/22 1252    Order Status: Sent Specimen: Abscess from Hand, Left Updated: 07/12/22 1345    Fungus culture [[id number]] Collected: 07/12/22 1252    Order Status: Sent Specimen: Abscess from Hand, Left Updated: 07/12/22 1344    Aerobic culture [[id number]] Collected: 07/12/22 1252    Order Status: Sent Specimen: Abscess from Hand, Left Updated: 07/12/22 1344    AFB Culture & Smear [[id number]]     Order Status: No result Specimen: Abscess from Hand, Left     Culture, Respiratory with Gram Stain [[id number]] Collected: 07/09/22 2302    Order Status: Completed Specimen: Respiratory from Sputum Updated: 07/12/22 0958     Respiratory Culture Normal respiratory elvia      No S aureus or Pseudomonas isolated.     Gram Stain (Respiratory) <10 epithelial cells per low power field.     Gram Stain (Respiratory) Rare WBC's     Gram Stain (Respiratory) Rare Gram positive cocci    Blood Culture #2 **CANNOT BE ORDERED STAT** [[id number]] Collected: 07/09/22 2110    Order Status: Completed Specimen: Blood from Peripheral, Forearm, Right Updated: 07/12/22 0613      Blood Culture, Routine No Growth to date      No Growth to date      No Growth to date    Blood Culture #1 **CANNOT BE ORDERED STAT** [[id number]] Collected: 07/09/22 2112    Order Status: Completed Specimen: Blood from Peripheral, Upper Arm, Left Updated: 07/12/22 0613     Blood Culture, Routine No Growth to date      No Growth to date      No Growth to date    AFB Culture & Smear [[id number]]     Order Status: No result Specimen: Respiratory from Sputum, Induced     Culture, MRSA [[id number]] Collected: 07/11/22 1101    Order Status: Sent Specimen: MRSA source from Nares, Left Updated: 07/11/22 2132    AFB Culture & Smear [[id number]] Collected: 07/09/22 2302    Order Status: Completed Specimen: Respiratory from Sputum Updated: 07/11/22 2127     AFB Culture & Smear Culture in progress     AFB CULTURE STAIN No acid fast bacilli seen.    Culture, Respiratory with Gram Stain [[id number]]     Order Status: No result Specimen: Respiratory from Sputum, Expectorated     AFB Culture & Smear [[id number]]     Order Status: No result Specimen: Respiratory from Sputum, Expectorated     AFB Culture & Smear [[id number]]     Order Status: No result Specimen: Respiratory from Sputum, Expectorated

## 2022-07-12 NOTE — PLAN OF CARE
Patient remains free from falls and injuries this shift. Patient has pending TB rule out. Awaiting results. Bedside I/D done today on left hand. Pain managed with OXY 10, Will continue to monitor patient.

## 2022-07-12 NOTE — ASSESSMENT & PLAN NOTE
7/11 Consult general surgery for possible I&D  Add daily hibiclens bath  7/12 Ortho consulted  Patient had I&D of left hand today- Culture sent to lab

## 2022-07-13 PROBLEM — E87.1 HYPONATREMIA: Status: RESOLVED | Noted: 2022-07-11 | Resolved: 2022-07-13

## 2022-07-13 PROBLEM — E83.42 HYPOMAGNESEMIA: Status: ACTIVE | Noted: 2022-07-13

## 2022-07-13 LAB
ALBUMIN SERPL BCP-MCNC: 2.4 G/DL (ref 3.5–5.2)
ALP SERPL-CCNC: 76 U/L (ref 55–135)
ALT SERPL W/O P-5'-P-CCNC: 17 U/L (ref 10–44)
ANION GAP SERPL CALC-SCNC: 8 MMOL/L (ref 8–16)
AST SERPL-CCNC: 22 U/L (ref 10–40)
BASOPHILS # BLD AUTO: 0.03 K/UL (ref 0–0.2)
BASOPHILS NFR BLD: 0.4 % (ref 0–1.9)
BILIRUB SERPL-MCNC: 0.2 MG/DL (ref 0.1–1)
BUN SERPL-MCNC: 6 MG/DL (ref 6–20)
CALCIUM SERPL-MCNC: 8.4 MG/DL (ref 8.7–10.5)
CHLORIDE SERPL-SCNC: 104 MMOL/L (ref 95–110)
CO2 SERPL-SCNC: 27 MMOL/L (ref 23–29)
CREAT SERPL-MCNC: 0.6 MG/DL (ref 0.5–1.4)
DIFFERENTIAL METHOD: ABNORMAL
EOSINOPHIL # BLD AUTO: 0.1 K/UL (ref 0–0.5)
EOSINOPHIL NFR BLD: 1.2 % (ref 0–8)
ERYTHROCYTE [DISTWIDTH] IN BLOOD BY AUTOMATED COUNT: 15 % (ref 11.5–14.5)
EST. GFR  (AFRICAN AMERICAN): >60 ML/MIN/1.73 M^2
EST. GFR  (NON AFRICAN AMERICAN): >60 ML/MIN/1.73 M^2
GAMMA INTERFERON BACKGROUND BLD IA-ACNC: 0 IU/ML
GLUCOSE SERPL-MCNC: 104 MG/DL (ref 70–110)
GRAM STN SPEC: NORMAL
GRAM STN SPEC: NORMAL
HCT VFR BLD AUTO: 27.2 % (ref 37–48.5)
HGB BLD-MCNC: 8.5 G/DL (ref 12–16)
IMM GRANULOCYTES # BLD AUTO: 0.02 K/UL (ref 0–0.04)
IMM GRANULOCYTES NFR BLD AUTO: 0.3 % (ref 0–0.5)
LYMPHOCYTES # BLD AUTO: 2.4 K/UL (ref 1–4.8)
LYMPHOCYTES NFR BLD: 33.3 % (ref 18–48)
M TB IFN-G CD4+ BCKGRND COR BLD-ACNC: 0 IU/ML
MAGNESIUM SERPL-MCNC: 1.7 MG/DL (ref 1.6–2.6)
MCH RBC QN AUTO: 24.6 PG (ref 27–31)
MCHC RBC AUTO-ENTMCNC: 31.3 G/DL (ref 32–36)
MCV RBC AUTO: 79 FL (ref 82–98)
MITOGEN IGNF BCKGRD COR BLD-ACNC: 0.4 IU/ML
MONOCYTES # BLD AUTO: 0.8 K/UL (ref 0.3–1)
MONOCYTES NFR BLD: 10.6 % (ref 4–15)
MRSA SPEC QL CULT: NORMAL
NEUTROPHILS # BLD AUTO: 3.9 K/UL (ref 1.8–7.7)
NEUTROPHILS NFR BLD: 54.2 % (ref 38–73)
NRBC BLD-RTO: 0 /100 WBC
PLATELET # BLD AUTO: 549 K/UL (ref 150–450)
PMV BLD AUTO: 9.3 FL (ref 9.2–12.9)
POTASSIUM SERPL-SCNC: 4.1 MMOL/L (ref 3.5–5.1)
PROT SERPL-MCNC: 6.9 G/DL (ref 6–8.4)
RBC # BLD AUTO: 3.46 M/UL (ref 4–5.4)
SODIUM SERPL-SCNC: 139 MMOL/L (ref 136–145)
TB GOLD PLUS: ABNORMAL
TB2 - NIL: 0 IU/ML
VANCOMYCIN TROUGH SERPL-MCNC: 7.3 UG/ML (ref 10–22)
WBC # BLD AUTO: 7.24 K/UL (ref 3.9–12.7)

## 2022-07-13 PROCEDURE — 63600175 PHARM REV CODE 636 W HCPCS: Performed by: INTERNAL MEDICINE

## 2022-07-13 PROCEDURE — 25000003 PHARM REV CODE 250: Performed by: INTERNAL MEDICINE

## 2022-07-13 PROCEDURE — 80053 COMPREHEN METABOLIC PANEL: CPT | Performed by: NURSE PRACTITIONER

## 2022-07-13 PROCEDURE — 25000003 PHARM REV CODE 250: Performed by: NURSE PRACTITIONER

## 2022-07-13 PROCEDURE — 27000207 HC ISOLATION

## 2022-07-13 PROCEDURE — 80202 ASSAY OF VANCOMYCIN: CPT | Performed by: INTERNAL MEDICINE

## 2022-07-13 PROCEDURE — 25000003 PHARM REV CODE 250: Performed by: FAMILY MEDICINE

## 2022-07-13 PROCEDURE — 85025 COMPLETE CBC W/AUTO DIFF WBC: CPT | Performed by: NURSE PRACTITIONER

## 2022-07-13 PROCEDURE — 99233 SBSQ HOSP IP/OBS HIGH 50: CPT | Mod: ,,, | Performed by: INTERNAL MEDICINE

## 2022-07-13 PROCEDURE — 63600175 PHARM REV CODE 636 W HCPCS: Performed by: NURSE PRACTITIONER

## 2022-07-13 PROCEDURE — 99233 PR SUBSEQUENT HOSPITAL CARE,LEVL III: ICD-10-PCS | Mod: ,,, | Performed by: INTERNAL MEDICINE

## 2022-07-13 PROCEDURE — 83735 ASSAY OF MAGNESIUM: CPT | Performed by: NURSE PRACTITIONER

## 2022-07-13 PROCEDURE — 11000001 HC ACUTE MED/SURG PRIVATE ROOM

## 2022-07-13 RX ORDER — AMOXICILLIN AND CLAVULANATE POTASSIUM 875; 125 MG/1; MG/1
1 TABLET, FILM COATED ORAL EVERY 12 HOURS
Status: DISCONTINUED | OUTPATIENT
Start: 2022-07-13 | End: 2022-07-14 | Stop reason: HOSPADM

## 2022-07-13 RX ORDER — LANOLIN ALCOHOL/MO/W.PET/CERES
400 CREAM (GRAM) TOPICAL DAILY
Status: DISCONTINUED | OUTPATIENT
Start: 2022-07-14 | End: 2022-07-14 | Stop reason: HOSPADM

## 2022-07-13 RX ORDER — VANCOMYCIN HCL IN 5 % DEXTROSE 1G/250ML
1000 PLASTIC BAG, INJECTION (ML) INTRAVENOUS
Status: DISCONTINUED | OUTPATIENT
Start: 2022-07-13 | End: 2022-07-13

## 2022-07-13 RX ORDER — MAGNESIUM SULFATE HEPTAHYDRATE 40 MG/ML
2 INJECTION, SOLUTION INTRAVENOUS ONCE
Status: COMPLETED | OUTPATIENT
Start: 2022-07-13 | End: 2022-07-13

## 2022-07-13 RX ADMIN — LORAZEPAM 0.5 MG: 0.5 TABLET ORAL at 08:07

## 2022-07-13 RX ADMIN — OXYCODONE AND ACETAMINOPHEN 1 TABLET: 10; 325 TABLET ORAL at 09:07

## 2022-07-13 RX ADMIN — LORAZEPAM 0.5 MG: 0.5 TABLET ORAL at 04:07

## 2022-07-13 RX ADMIN — METHADONE HYDROCHLORIDE 10 MG: 10 TABLET ORAL at 08:07

## 2022-07-13 RX ADMIN — OXYCODONE AND ACETAMINOPHEN 1 TABLET: 10; 325 TABLET ORAL at 05:07

## 2022-07-13 RX ADMIN — Medication 324 MG: at 04:07

## 2022-07-13 RX ADMIN — OXYCODONE AND ACETAMINOPHEN 1 TABLET: 10; 325 TABLET ORAL at 04:07

## 2022-07-13 RX ADMIN — CEFEPIME 2 G: 2 INJECTION, POWDER, FOR SOLUTION INTRAVENOUS at 05:07

## 2022-07-13 RX ADMIN — CEFEPIME 2 G: 2 INJECTION, POWDER, FOR SOLUTION INTRAVENOUS at 12:07

## 2022-07-13 RX ADMIN — Medication 324 MG: at 08:07

## 2022-07-13 RX ADMIN — VANCOMYCIN HYDROCHLORIDE 1000 MG: 1 INJECTION, POWDER, FOR SOLUTION INTRAVENOUS at 12:07

## 2022-07-13 RX ADMIN — THERA TABS 1 TABLET: TAB at 08:07

## 2022-07-13 RX ADMIN — DOCUSATE SODIUM 100 MG: 100 CAPSULE, LIQUID FILLED ORAL at 08:07

## 2022-07-13 RX ADMIN — OXYCODONE HYDROCHLORIDE AND ACETAMINOPHEN 500 MG: 500 TABLET ORAL at 09:07

## 2022-07-13 RX ADMIN — METRONIDAZOLE 500 MG: 500 TABLET ORAL at 05:07

## 2022-07-13 RX ADMIN — MAGNESIUM SULFATE HEPTAHYDRATE 2 G: 40 INJECTION, SOLUTION INTRAVENOUS at 04:07

## 2022-07-13 RX ADMIN — OXYCODONE AND ACETAMINOPHEN 1 TABLET: 10; 325 TABLET ORAL at 12:07

## 2022-07-13 RX ADMIN — LORAZEPAM 0.5 MG: 0.5 TABLET ORAL at 09:07

## 2022-07-13 RX ADMIN — IRON SUCROSE 200 MG: 20 INJECTION, SOLUTION INTRAVENOUS at 12:07

## 2022-07-13 RX ADMIN — AMOXICILLIN AND CLAVULANATE POTASSIUM 1 TABLET: 875; 125 TABLET, FILM COATED ORAL at 09:07

## 2022-07-13 NOTE — PROGRESS NOTES
Therapy with vancomycin complete and/or consult discontinued by provider.  IV antibiotics switched to oral Augmentin. Pharmacy will sign off, please re-consult as needed.    Thank you for allowing us to participate in this patient's care.   Katherine McArdle, Pharm.D. 7/13/2022 3:42 PM

## 2022-07-13 NOTE — PROGRESS NOTES
[facility] Surg  Pulmonology  Progress Note    Patient Name: Carolina Frank  MRN: [id number]  Admission Date: 7/9/2022  Hospital Length of Stay: 4 days  Code Status: No Order  Attending Provider: Eusebio Monroy MD  Primary Care Provider: Primary Doctor No   Principal Problem: Abscess of lower lobe of right lung with pneumonia    Subjective: feels better and wants to go home     Past Medical History:   Diagnosis Date    Overdose of illicit drug        History reviewed. No pertinent surgical history.    Review of patient's allergies indicates:  No Known Allergies    Family History    None       Tobacco Use    Smoking status: Current Every Day Smoker     Types: Cigarettes    Smokeless tobacco: Never Used   Substance and Sexual Activity    Alcohol use: Not Currently    Drug use: Not Currently    Sexual activity: Yes     Partners: Male     Birth control/protection: Injection         Review of Systems   Constitutional:  Positive for chills, fatigue and fever.   Eyes: Negative.    Respiratory:  Positive for cough.    Cardiovascular: Negative.    Gastrointestinal: Negative.    Endocrine: Negative.    Genitourinary: Negative.    Musculoskeletal:         Left hand wrapped    Allergic/Immunologic: Negative.    Neurological: Negative.    Hematological: Negative.    Psychiatric/Behavioral: Negative.     Objective:     Vital Signs (Most Recent):  Temp: 99 °F (37.2 °C) (07/13/22 1221)  Pulse: 77 (07/13/22 1221)  Resp: 17 (07/13/22 1247)  BP: 105/63 (07/13/22 1221)  SpO2: 99 % (07/13/22 1221)   Vital Signs (24h Range):  Temp:  [97.8 °F (36.6 °C)-99.2 °F (37.3 °C)] 99 °F (37.2 °C)  Pulse:  [77-95] 77  Resp:  [16-20] 17  SpO2:  [98 %-99 %] 99 %  BP: ()/(52-75) 105/63     Weight: 56.6 kg (124 lb 12.5 oz)  Body mass index is 21.42 kg/m².      Intake/Output Summary (Last 24 hours) at 7/13/2022 1518  Last data filed at 7/13/2022 0803  Gross per 24 hour   Intake 358 ml   Output --   Net 358 ml         Physical Exam  Vitals  and nursing note reviewed.   HENT:      Head: Normocephalic and atraumatic.      Mouth/Throat:      Mouth: Mucous membranes are moist.   Eyes:      Pupils: Pupils are equal, round, and reactive to light.   Cardiovascular:      Rate and Rhythm: Normal rate and regular rhythm.      Pulses: Normal pulses.      Heart sounds: Normal heart sounds.   Pulmonary:      Effort: Pulmonary effort is normal.      Breath sounds: Examination of the right-lower field reveals decreased breath sounds and rales. Decreased breath sounds and rales present.       Abdominal:      General: Bowel sounds are normal.      Palpations: Abdomen is soft.   Musculoskeletal:         General: Swelling present. Normal range of motion.      Cervical back: Normal range of motion.      Comments: Left arm - bandage   Skin:     General: Skin is warm.   Neurological:      Mental Status: She is alert and oriented to person, place, and time.       Vents:       Lines/Drains/Airways       Peripheral Intravenous Line  Duration                  Midline Catheter Insertion/Assessment  - Single Lumen 07/10/22 1100 Left basilic vein (medial side of arm) 20g x 8cm 3 days                    Significant Labs:    CBC/Anemia Profile:  Recent Labs   Lab 07/13/22  0524   WBC 7.24   HGB 8.5*   HCT 27.2*   *   MCV 79*   RDW 15.0*          Chemistries:  Recent Labs   Lab 07/13/22  0524      K 4.1      CO2 27   BUN 6   CREATININE 0.6   CALCIUM 8.4*   ALBUMIN 2.4*   PROT 6.9   BILITOT 0.2   ALKPHOS 76   ALT 17   AST 22   MG 1.7         ABGs: No results for input(s): PH, PCO2, HCO3, POCSATURATED, BE in the last 48 hours.  POCT Glucose: No results for input(s): POCTGLUCOSE in the last 48 hours.  Respiratory Culture: No results for input(s): GSRESP, RESPIRATORYC in the last 48 hours.    All pertinent labs within the past 24 hours have been reviewed.    Significant Imaging:   I have reviewed all pertinent imaging results/findings within the past 24 hours.    CT  Chest Without Contrast  Narrative: EXAMINATION:  CT CHEST WITHOUT CONTRAST, multiplanar reconstructions    CLINICAL HISTORY:  Pneumonia, unresolved;Aspiration;Compare to prior;    TECHNIQUE:  Axial images through the chest were obtained without the use of IV contrast. Sagittal and coronal <reconstructions are provided for review>.    COMPARISON:  June 9, 2022    FINDINGS:  The amount of parenchymal density surrounding the medial right lower lobe cavitary lesion has decreased in the interim.  The overall size of the complex cavitary lesion as well as the amount of air centrally has decreased in the interim, measuring a maximum of 7.9 cm in length on image 59 of series 2, previously measuring 8.7 cm on the same.  The lungs are free of new pulmonary opacities.    Prominent but not abnormal by size criteria mediastinal lymph nodes again seen.  Small pericardial effusion.    The airways are patent.  The thyroid gland demonstrates the presence of multiple small nodules measuring up to 5 mm.  The esophagus is normal.    The upper abdominal organs are unchanged.    The osseous structures are unchanged.  Impression: 1.  Interval improvement.  The overall size of the complex cavitary lesion in the medial right lower lobe with surrounding parenchymal opacity has mildly decreased in the interim as detailed above.  Continued follow-up studies recommended.    2.  Stable findings as noted above to include a small pericardial effusion.    All CT scans at this facility are performed  using dose modulation techniques as appropriate to performed exam including the following:  automated exposure control; adjustment of mA and/or kV according to the patients size (this includes techniques or standardized protocols for targeted exams where dose is matched to indication/reason for exam: i.e. extremities or head);  iterative reconstruction technique.    Electronically signed by: Sanchez Lal MD  Date:    07/13/2022  Time:    11:35        ABG  No results for input(s): PH, PO2, PCO2, HCO3, BE in the last 168 hours.  Assessment/Plan:     * Abscess of lower lobe of right lung with pneumonia - superior segment  Sputum Culture  AFB X 3  Quantiferon  Cont Abx: VANC, CEFEPIME , FLAGYL  MRSA screen  Deescalate to UNASYN  regular diet  No pulmonary procedure anticipated today  7/11/22 AFB negative x1, pattern of abscess in superior segment of right lower lobe typical for aspiration pneumonia . Will need long term antibiotics. Malignancy is unlikely   7/12 Check CT of chest for progress  7/13 Improved CT of lung  Will need one month of antibiotics Augmentin BID  Follow up in pulmonary office in one month with Chest X Ray            Buzz Tilley MD  Pulmonology  [facility] - [facility]

## 2022-07-13 NOTE — PROGRESS NOTES
[facility] Medicine  Progress Note    Patient Name: Carolina Frank  MRN: [id number]  Patient Class: IP- Inpatient   Admission Date: 7/9/2022  Length of Stay: 4 days  Attending Physician: Eusebio oMnroy MD  Primary Care Provider: Primary Doctor No      Subjective:     Principal Problem:Abscess of lower lobe of right lung with pneumonia      HPI:  Ms. Frank is a 27-year-old  female with no significant medical problems, substance abuse, tobacco user, presented to the ED complaining of for 5-6 days of right-sided pleuritic-type chest discomfort, associated with dry nonproductive cough.  She denies fever, chills.  Not requiring supplemental oxygen.  In the ED she was found to have 5.1 x 2.2 x 4.5 cm cavitary lesion with air-fluid level in the right medial lower lobe, concerning for abscess.  Received IV vancomycin, cefepime in the ED.  afebrile, hemodynamically stable.  UDS positive for amphetamines.    Admitting diagnosis:  Right lung abscess      Overview/Hospital Course:  7/10:  Continue broad-spectrum antibiotics.  Cultures pending.  Patient currently stable on room air.  Plan for midline.  Patient endorses using heroin daily.  Will start  methadone for withdrawal symptoms.  7/11 Left hand abscess. Consult General surgery for possible I&D. Add daily hibiclens bath.  7/12 Ortho consulted for I&D of left hand abscess. Add ativan for anxiety.   7/13 Monitor anemia. Add Iv venofer. Repeat Ct scan of chest.  consulted to provide information for inpatient and outpatient options for substance abuse.       Interval History: 7/13 Monitor anemia. Add Iv venofer. Repeat Ct scan of chest.  consulted to provide information for inpatient and outpatient options for substance abuse.      Review of Systems   Constitutional:  Positive for appetite change. Negative for activity change, chills, diaphoresis, fatigue and fever.   HENT:  Negative for ear pain, facial swelling,  hearing loss and sinus pressure.    Eyes:  Negative for pain and redness.   Respiratory:  Positive for cough. Negative for shortness of breath.    Cardiovascular:  Negative for chest pain, palpitations and leg swelling.   Gastrointestinal:  Negative for abdominal distention, abdominal pain, blood in stool, constipation, diarrhea, nausea and vomiting.   Endocrine: Negative for polydipsia and polyphagia.   Genitourinary:  Negative for difficulty urinating, dysuria, flank pain and hematuria.   Musculoskeletal:  Negative for gait problem, neck pain and neck stiffness.   Skin:  Negative for color change.   Allergic/Immunologic: Negative for food allergies.   Neurological:  Negative for seizures, facial asymmetry, speech difficulty and weakness.   Hematological:  Does not bruise/bleed easily.   Psychiatric/Behavioral:  Negative for agitation, behavioral problems, confusion, hallucinations and suicidal ideas. The patient is not nervous/anxious.         Anxious   Objective:     Vital Signs (Most Recent):  Temp: 99 °F (37.2 °C) (07/13/22 1221)  Pulse: 77 (07/13/22 1221)  Resp: 17 (07/13/22 1247)  BP: 105/63 (07/13/22 1221)  SpO2: 99 % (07/13/22 1221) Vital Signs (24h Range):  Temp:  [97.8 °F (36.6 °C)-99.2 °F (37.3 °C)] 99 °F (37.2 °C)  Pulse:  [77-95] 77  Resp:  [16-20] 17  SpO2:  [98 %-99 %] 99 %  BP: ()/(52-75) 105/63     Weight: 56.6 kg (124 lb 12.5 oz)  Body mass index is 21.42 kg/m².    Intake/Output Summary (Last 24 hours) at 7/13/2022 1603  Last data filed at 7/13/2022 0803  Gross per 24 hour   Intake 358 ml   Output --   Net 358 ml      Physical Exam  Constitutional:       General: She is not in acute distress.     Appearance: She is well-developed. She is not ill-appearing or diaphoretic.   HENT:      Head: Normocephalic and atraumatic.      Mouth/Throat:      Mouth: Mucous membranes are moist.   Eyes:      General:         Right eye: No discharge.         Left eye: No discharge.      Extraocular Movements:  Extraocular movements intact.      Conjunctiva/sclera: Conjunctivae normal.      Pupils: Pupils are equal, round, and reactive to light.   Cardiovascular:      Rate and Rhythm: Normal rate and regular rhythm.   Pulmonary:      Effort: Pulmonary effort is normal. No respiratory distress.      Breath sounds: No rhonchi.   Abdominal:      General: There is no distension.      Tenderness: There is no abdominal tenderness. There is no guarding.   Genitourinary:     Comments: Not examined  Musculoskeletal:         General: Normal range of motion.      Cervical back: Normal range of motion and neck supple. No rigidity or tenderness.      Right lower leg: No edema.      Left lower leg: No edema.      Comments: Right upper extremity edema   Skin:     General: Skin is warm and dry.      Capillary Refill: Capillary refill takes less than 2 seconds.      Comments: Left hand dressing   Neurological:      General: No focal deficit present.      Mental Status: She is alert and oriented to person, place, and time. Mental status is at baseline.      Cranial Nerves: No cranial nerve deficit.   Psychiatric:         Mood and Affect: Mood normal.         Behavior: Behavior normal.         Thought Content: Thought content normal.         Judgment: Judgment normal.     Lab Results   Component Value Date    WBC 7.24 07/13/2022    HGB 8.5 (L) 07/13/2022    HCT 27.2 (L) 07/13/2022    MCV 79 (L) 07/13/2022     (H) 07/13/2022       BMP  Lab Results   Component Value Date     07/13/2022    K 4.1 07/13/2022     07/13/2022    CO2 27 07/13/2022    BUN 6 07/13/2022    CREATININE 0.6 07/13/2022    CALCIUM 8.4 (L) 07/13/2022    ANIONGAP 8 07/13/2022    ESTGFRAFRICA >60 07/13/2022    EGFRNONAA >60 07/13/2022              Assessment/Plan:      * Abscess of lower lobe of right lung with pneumonia - superior segment  -rule out AFB  -continue IV vancomycin, IV cefepime empirically.  -bronchodilators as needed.  -supplemental oxygen as  needed.  -pulmonary consult in a.m..  -keep NPO past midnight.    7/10:  Continue broad-spectrum antibiotics  Pulmonology on case  Sputum cultures pending  Awaiting AFB    7/12 AFB from 7/09 showed    Culture in progress      AFB CULTURE STAIN No acid fast bacilli seen.     7/13  Repeat Ct scan of chest.     Hypoalbuminemia  7/11 Add po supplement      Thrombocytosis  7/11 Monitor      Abscess of left hand  7/11 Consult general surgery for possible I&D  Add daily hibiclens bath  7/12 Ortho consulted  Patient had I&D of left hand today- Culture sent to lab  7/13 Wound care as per Surgeon      Microcytic anemia  7/11 Add MVI, bid iron, and Pm Vit c  7/12 add dose Iv venofer      Edema of right upper arm  Awaiting ultrasound to rule out DVT  7/11 Ultrasound negative for DVT      Hypomagnesia-  7/13 Add 2gm IV  Add po mag oxide    Nicotine dependence  7/09 -nicotine patch  7/11 Smoking cessation education > 3 minutes    Substance abuse- Heroine  -UDS positive for amphetamines    7/10:  UDS positive for Only amphetamines  However patient is reports frequent heroin use  States that she does have withdrawal symptoms whenever she stops using  Start methadone empirically    7/12 Continue methadone  7/13  consulted to provide information for inpatient and outpatient options for substance abuse.         VTE Risk Mitigation (From admission, onward)         Ordered     Place NIRAV hose  Until discontinued         07/11/22 1818     Place sequential compression device  Until discontinued         07/09/22 2244                Discharge Planning   CLIFTON:      Code Status: Not on file   Is the patient medically ready for discharge?:     Reason for patient still in hospital (select all that apply): Treatment  Discharge Plan A: Home        Time spent seeing patient( greater than 1/2 spent in direct contact) : 38 minutes    GINO Snow  Department of Hospital Medicine   [facility] - Med Surg

## 2022-07-13 NOTE — ASSESSMENT & PLAN NOTE
7/11 Consult general surgery for possible I&D  Add daily hibiclens bath  7/12 Ortho consulted  Patient had I&D of left hand today- Culture sent to lab  7/13 Wound care as per Surgeon

## 2022-07-13 NOTE — ASSESSMENT & PLAN NOTE
Sputum Culture  AFB X 3  Quantiferon  Cont Abx: VANC, CEFEPIME , FLAGYL  MRSA screen  Deescalate to UNASYN  regular diet  No pulmonary procedure anticipated today  7/11/22 AFB negative x1, pattern of abscess in superior segment of right lower lobe typical for aspiration pneumonia . Will need long term antibiotics. Malignancy is unlikely   7/12 Check CT of chest for progress  7/13 Improved CT of lung  Will need one month of antibiotics Augmentin BID  Follow up in pulmonary office in one month with Chest X Ray

## 2022-07-13 NOTE — PROGRESS NOTES
Pharmacokinetic Assessment Follow Up: IV Vancomycin    Vancomycin serum concentration assessment(s):    The trough level was drawn correctly and can be used to guide therapy at this time. The measurement is below the desired definitive target range of 15 to 20 mcg/mL.    Vancomycin Regimen Plan:    Change regimen to Vancomycin 1000 mg IV every 8 hours with next serum trough concentration measured at 0415 prior to 3rd dose on 7/14    Drug levels (last 3 results):  Recent Labs   Lab Result Units 07/11/22  1141 07/12/22  1407 07/13/22  0835   Vancomycin-Trough ug/mL 3.8* 20.2 7.3*       Pharmacy will continue to follow and monitor vancomycin.    Please contact pharmacy at extension [phone] for questions regarding this assessment.    Thank you for the consult,   So Rios       Patient brief summary:  Carolina Frank is a 27 y.o. female initiated on antimicrobial therapy with IV Vancomycin for treatment of lower respiratory infection    The patient's current regimen is 1000mg every 8 hours    Drug Allergies:   Review of patient's allergies indicates:  No Known Allergies    Actual Body Weight:   56.6kg    Renal Function:   Estimated Creatinine Clearance: 122.7 mL/min (based on SCr of 0.6 mg/dL).,     Dialysis Method (if applicable):  N/A    CBC (last 72 hours):  Recent Labs   Lab Result Units 07/13/22  0524   WBC K/uL 7.24   Hemoglobin g/dL 8.5*   Hematocrit % 27.2*   Platelets K/uL 549*   Gran % % 54.2   Lymph % % 33.3   Mono % % 10.6   Eosinophil % % 1.2   Basophil % % 0.4   Differential Method  Automated       Metabolic Panel (last 72 hours):  Recent Labs   Lab Result Units 07/11/22  1141 07/13/22  0524   Sodium mmol/L  --  139   Potassium mmol/L  --  4.1   Chloride mmol/L  --  104   CO2 mmol/L  --  27   Glucose mg/dL  --  104   BUN mg/dL  --  6   Creatinine mg/dL 0.5 0.6   Albumin g/dL  --  2.4*   Total Bilirubin mg/dL  --  0.2   Alkaline Phosphatase U/L  --  76   AST U/L  --  22   ALT U/L  --  17    Magnesium mg/dL  --  1.7       Vancomycin Administrations:  vancomycin given in the last 96 hours                     vancomycin in dextrose 5 % 1 gram/250 mL IVPB 1,000 mg (mg) 1,000 mg New Bag 07/13/22 1245    vancomycin 750 mg in dextrose 5 % 250 mL IVPB (ready to mix system) (mg) 750 mg New Bag 07/12/22 2343     750 mg New Bag  1620    vancomycin in dextrose 5 % 1 gram/250 mL IVPB 1,000 mg (mg) 1,000 mg New Bag 07/12/22 0606     1,000 mg New Bag 07/11/22 2258    vancomycin in dextrose 5 % 1 gram/250 mL IVPB 1,000 mg (mg) 1,000 mg New Bag 07/11/22 1408     1,000 mg New Bag 07/10/22 2337      Restarted  1310     1,000 mg New Bag  1209    vancomycin 1.5 g in dextrose 5 % 250 mL IVPB (ready to mix) (mg) 1,500 mg New Bag 07/10/22 0033                    Microbiologic Results:  Microbiology Results (last 7 days)       Procedure Component Value Units Date/Time    AFB Culture & Smear [[id number]] Collected: 07/12/22 1727    Order Status: Completed Specimen: Respiratory from Sputum, Expectorated Updated: 07/13/22 1255     AFB CULTURE STAIN No acid fast bacilli seen.    Gram stain [[id number]] Collected: 07/12/22 1252    Order Status: Completed Specimen: Abscess from Hand, Left Updated: 07/13/22 0838     Gram Stain Result No WBC's      No organisms seen    Culture, MRSA [[id number]] Collected: 07/11/22 1101    Order Status: Completed Specimen: MRSA source from Nares, Left Updated: 07/13/22 0717     MRSA Surveillance Screen No MRSA isolated    Blood Culture #2 **CANNOT BE ORDERED STAT** [[id number]] Collected: 07/09/22 2110    Order Status: Completed Specimen: Blood from Peripheral, Forearm, Right Updated: 07/13/22 0612     Blood Culture, Routine No Growth to date      No Growth to date      No Growth to date      No Growth to date    Blood Culture #1 **CANNOT BE ORDERED STAT** [[id number]] Collected: 07/09/22 2112    Order Status: Completed Specimen: Blood from Peripheral, Upper Arm, Left Updated: 07/13/22 0612     Blood  Culture, Routine No Growth to date      No Growth to date      No Growth to date      No Growth to date    Aerobic culture [[id number]] Collected: 07/12/22 1252    Order Status: Sent Specimen: Abscess from Hand, Left Updated: 07/13/22 0304    Culture, Anaerobe [[id number]] Collected: 07/12/22 1252    Order Status: Sent Specimen: Abscess from Hand, Left Updated: 07/13/22 0304    Fungus culture [[id number]] Collected: 07/12/22 1252    Order Status: Sent Specimen: Abscess from Hand, Left Updated: 07/13/22 0304    Culture, Respiratory with Gram Stain [[id number]] Collected: 07/09/22 2302    Order Status: Completed Specimen: Respiratory from Sputum Updated: 07/12/22 0958     Respiratory Culture Normal respiratory elvia      No S aureus or Pseudomonas isolated.     Gram Stain (Respiratory) <10 epithelial cells per low power field.     Gram Stain (Respiratory) Rare WBC's     Gram Stain (Respiratory) Rare Gram positive cocci    AFB Culture & Smear [[id number]]     Order Status: No result Specimen: Respiratory from Sputum, Induced     AFB Culture & Smear [[id number]] Collected: 07/09/22 2302    Order Status: Completed Specimen: Respiratory from Sputum Updated: 07/11/22 2127     AFB Culture & Smear Culture in progress     AFB CULTURE STAIN No acid fast bacilli seen.    Culture, Respiratory with Gram Stain [[id number]]     Order Status: No result Specimen: Respiratory from Sputum, Expectorated     AFB Culture & Smear [[id number]]     Order Status: No result Specimen: Respiratory from Sputum, Expectorated     AFB Culture & Smear [[id number]]     Order Status: No result Specimen: Respiratory from Sputum, Expectorated

## 2022-07-13 NOTE — ASSESSMENT & PLAN NOTE
-rule out AFB  -continue IV vancomycin, IV cefepime empirically.  -bronchodilators as needed.  -supplemental oxygen as needed.  -pulmonary consult in a.m..  -keep NPO past midnight.    7/10:  Continue broad-spectrum antibiotics  Pulmonology on case  Sputum cultures pending  Awaiting AFB    7/12 AFB from 7/09 showed    Culture in progress      AFB CULTURE STAIN No acid fast bacilli seen.     7/13  Repeat Ct scan of chest.

## 2022-07-13 NOTE — SUBJECTIVE & OBJECTIVE
Past Medical History:   Diagnosis Date    Overdose of illicit drug        History reviewed. No pertinent surgical history.    Review of patient's allergies indicates:  No Known Allergies    Family History    None       Tobacco Use    Smoking status: Current Every Day Smoker     Types: Cigarettes    Smokeless tobacco: Never Used   Substance and Sexual Activity    Alcohol use: Not Currently    Drug use: Not Currently    Sexual activity: Yes     Partners: Male     Birth control/protection: Injection         Review of Systems   Constitutional:  Positive for chills, fatigue and fever.   Eyes: Negative.    Respiratory:  Positive for cough.    Cardiovascular: Negative.    Gastrointestinal: Negative.    Endocrine: Negative.    Genitourinary: Negative.    Musculoskeletal:         Left hand wrapped    Allergic/Immunologic: Negative.    Neurological: Negative.    Hematological: Negative.    Psychiatric/Behavioral: Negative.     Objective:     Vital Signs (Most Recent):  Temp: 99 °F (37.2 °C) (07/13/22 1221)  Pulse: 77 (07/13/22 1221)  Resp: 17 (07/13/22 1247)  BP: 105/63 (07/13/22 1221)  SpO2: 99 % (07/13/22 1221)   Vital Signs (24h Range):  Temp:  [97.8 °F (36.6 °C)-99.2 °F (37.3 °C)] 99 °F (37.2 °C)  Pulse:  [77-95] 77  Resp:  [16-20] 17  SpO2:  [98 %-99 %] 99 %  BP: ()/(52-75) 105/63     Weight: 56.6 kg (124 lb 12.5 oz)  Body mass index is 21.42 kg/m².      Intake/Output Summary (Last 24 hours) at 7/13/2022 1518  Last data filed at 7/13/2022 0803  Gross per 24 hour   Intake 358 ml   Output --   Net 358 ml         Physical Exam  Vitals and nursing note reviewed.   HENT:      Head: Normocephalic and atraumatic.      Mouth/Throat:      Mouth: Mucous membranes are moist.   Eyes:      Pupils: Pupils are equal, round, and reactive to light.   Cardiovascular:      Rate and Rhythm: Normal rate and regular rhythm.      Pulses: Normal pulses.      Heart sounds: Normal heart sounds.   Pulmonary:      Effort: Pulmonary effort is  normal.      Breath sounds: Examination of the right-lower field reveals decreased breath sounds and rales. Decreased breath sounds and rales present.       Abdominal:      General: Bowel sounds are normal.      Palpations: Abdomen is soft.   Musculoskeletal:         General: Swelling present. Normal range of motion.      Cervical back: Normal range of motion.      Comments: Left arm - bandage   Skin:     General: Skin is warm.   Neurological:      Mental Status: She is alert and oriented to person, place, and time.       Vents:       Lines/Drains/Airways       Peripheral Intravenous Line  Duration                  Midline Catheter Insertion/Assessment  - Single Lumen 07/10/22 1100 Left basilic vein (medial side of arm) 20g x 8cm 3 days                    Significant Labs:    CBC/Anemia Profile:  Recent Labs   Lab 07/13/22  0524   WBC 7.24   HGB 8.5*   HCT 27.2*   *   MCV 79*   RDW 15.0*          Chemistries:  Recent Labs   Lab 07/13/22 0524      K 4.1      CO2 27   BUN 6   CREATININE 0.6   CALCIUM 8.4*   ALBUMIN 2.4*   PROT 6.9   BILITOT 0.2   ALKPHOS 76   ALT 17   AST 22   MG 1.7         ABGs: No results for input(s): PH, PCO2, HCO3, POCSATURATED, BE in the last 48 hours.  POCT Glucose: No results for input(s): POCTGLUCOSE in the last 48 hours.  Respiratory Culture: No results for input(s): GSRESP, RESPIRATORYC in the last 48 hours.    All pertinent labs within the past 24 hours have been reviewed.    Significant Imaging:   I have reviewed all pertinent imaging results/findings within the past 24 hours.    CT Chest Without Contrast  Narrative: EXAMINATION:  CT CHEST WITHOUT CONTRAST, multiplanar reconstructions    CLINICAL HISTORY:  Pneumonia, unresolved;Aspiration;Compare to prior;    TECHNIQUE:  Axial images through the chest were obtained without the use of IV contrast. Sagittal and coronal <reconstructions are provided for review>.    COMPARISON:  June 9, 2022    FINDINGS:  The amount of  parenchymal density surrounding the medial right lower lobe cavitary lesion has decreased in the interim.  The overall size of the complex cavitary lesion as well as the amount of air centrally has decreased in the interim, measuring a maximum of 7.9 cm in length on image 59 of series 2, previously measuring 8.7 cm on the same.  The lungs are free of new pulmonary opacities.    Prominent but not abnormal by size criteria mediastinal lymph nodes again seen.  Small pericardial effusion.    The airways are patent.  The thyroid gland demonstrates the presence of multiple small nodules measuring up to 5 mm.  The esophagus is normal.    The upper abdominal organs are unchanged.    The osseous structures are unchanged.  Impression: 1.  Interval improvement.  The overall size of the complex cavitary lesion in the medial right lower lobe with surrounding parenchymal opacity has mildly decreased in the interim as detailed above.  Continued follow-up studies recommended.    2.  Stable findings as noted above to include a small pericardial effusion.    All CT scans at this facility are performed  using dose modulation techniques as appropriate to performed exam including the following:  automated exposure control; adjustment of mA and/or kV according to the patients size (this includes techniques or standardized protocols for targeted exams where dose is matched to indication/reason for exam: i.e. extremities or head);  iterative reconstruction technique.    Electronically signed by: Sanchez Lal MD  Date:    07/13/2022  Time:    11:35

## 2022-07-13 NOTE — ASSESSMENT & PLAN NOTE
-UDS positive for amphetamines    7/10:  UDS positive for Only amphetamines  However patient is reports frequent heroin use  States that she does have withdrawal symptoms whenever she stops using  Start methadone empirically    7/12 Continue methadone  7/13  consulted to provide information for inpatient and outpatient options for substance abuse.

## 2022-07-13 NOTE — SUBJECTIVE & OBJECTIVE
Interval History: 7/13 Monitor anemia. Add Iv venofer. Repeat Ct scan of chest.  consulted to provide information for inpatient and outpatient options for substance abuse.      Review of Systems   Constitutional:  Positive for appetite change. Negative for activity change, chills, diaphoresis, fatigue and fever.   HENT:  Negative for ear pain, facial swelling, hearing loss and sinus pressure.    Eyes:  Negative for pain and redness.   Respiratory:  Positive for cough. Negative for shortness of breath.    Cardiovascular:  Negative for chest pain, palpitations and leg swelling.   Gastrointestinal:  Negative for abdominal distention, abdominal pain, blood in stool, constipation, diarrhea, nausea and vomiting.   Endocrine: Negative for polydipsia and polyphagia.   Genitourinary:  Negative for difficulty urinating, dysuria, flank pain and hematuria.   Musculoskeletal:  Negative for gait problem, neck pain and neck stiffness.   Skin:  Negative for color change.   Allergic/Immunologic: Negative for food allergies.   Neurological:  Negative for seizures, facial asymmetry, speech difficulty and weakness.   Hematological:  Does not bruise/bleed easily.   Psychiatric/Behavioral:  Negative for agitation, behavioral problems, confusion, hallucinations and suicidal ideas. The patient is not nervous/anxious.         Anxious   Objective:     Vital Signs (Most Recent):  Temp: 99 °F (37.2 °C) (07/13/22 1221)  Pulse: 77 (07/13/22 1221)  Resp: 17 (07/13/22 1247)  BP: 105/63 (07/13/22 1221)  SpO2: 99 % (07/13/22 1221) Vital Signs (24h Range):  Temp:  [97.8 °F (36.6 °C)-99.2 °F (37.3 °C)] 99 °F (37.2 °C)  Pulse:  [77-95] 77  Resp:  [16-20] 17  SpO2:  [98 %-99 %] 99 %  BP: ()/(52-75) 105/63     Weight: 56.6 kg (124 lb 12.5 oz)  Body mass index is 21.42 kg/m².    Intake/Output Summary (Last 24 hours) at 7/13/2022 1609  Last data filed at 7/13/2022 0803  Gross per 24 hour   Intake 358 ml   Output --   Net 358 ml       Physical Exam  Constitutional:       General: She is not in acute distress.     Appearance: She is well-developed. She is not ill-appearing or diaphoretic.   HENT:      Head: Normocephalic and atraumatic.      Mouth/Throat:      Mouth: Mucous membranes are moist.   Eyes:      General:         Right eye: No discharge.         Left eye: No discharge.      Extraocular Movements: Extraocular movements intact.      Conjunctiva/sclera: Conjunctivae normal.      Pupils: Pupils are equal, round, and reactive to light.   Cardiovascular:      Rate and Rhythm: Normal rate and regular rhythm.   Pulmonary:      Effort: Pulmonary effort is normal. No respiratory distress.      Breath sounds: No rhonchi.   Abdominal:      General: There is no distension.      Tenderness: There is no abdominal tenderness. There is no guarding.   Genitourinary:     Comments: Not examined  Musculoskeletal:         General: Normal range of motion.      Cervical back: Normal range of motion and neck supple. No rigidity or tenderness.      Right lower leg: No edema.      Left lower leg: No edema.      Comments: Right upper extremity edema   Skin:     General: Skin is warm and dry.      Capillary Refill: Capillary refill takes less than 2 seconds.      Comments: Left hand dressing   Neurological:      General: No focal deficit present.      Mental Status: She is alert and oriented to person, place, and time. Mental status is at baseline.      Cranial Nerves: No cranial nerve deficit.   Psychiatric:         Mood and Affect: Mood normal.         Behavior: Behavior normal.         Thought Content: Thought content normal.         Judgment: Judgment normal.     Lab Results   Component Value Date    WBC 7.24 07/13/2022    HGB 8.5 (L) 07/13/2022    HCT 27.2 (L) 07/13/2022    MCV 79 (L) 07/13/2022     (H) 07/13/2022       BMP  Lab Results   Component Value Date     07/13/2022    K 4.1 07/13/2022     07/13/2022    CO2 27 07/13/2022    BUN 6  07/13/2022    CREATININE 0.6 07/13/2022    CALCIUM 8.4 (L) 07/13/2022    ANIONGAP 8 07/13/2022    ESTGFRAFRICA >60 07/13/2022    EGFRNONAA >60 07/13/2022

## 2022-07-14 VITALS
TEMPERATURE: 99 F | WEIGHT: 124.75 LBS | HEART RATE: 85 BPM | BODY MASS INDEX: 21.3 KG/M2 | DIASTOLIC BLOOD PRESSURE: 55 MMHG | HEIGHT: 64 IN | RESPIRATION RATE: 16 BRPM | OXYGEN SATURATION: 99 % | SYSTOLIC BLOOD PRESSURE: 100 MMHG

## 2022-07-14 LAB
BASOPHILS # BLD AUTO: 0.03 K/UL (ref 0–0.2)
BASOPHILS NFR BLD: 0.5 % (ref 0–1.9)
DIFFERENTIAL METHOD: ABNORMAL
EOSINOPHIL # BLD AUTO: 0.1 K/UL (ref 0–0.5)
EOSINOPHIL NFR BLD: 1.4 % (ref 0–8)
ERYTHROCYTE [DISTWIDTH] IN BLOOD BY AUTOMATED COUNT: 15.3 % (ref 11.5–14.5)
HCT VFR BLD AUTO: 28 % (ref 37–48.5)
HGB BLD-MCNC: 8.7 G/DL (ref 12–16)
IMM GRANULOCYTES # BLD AUTO: 0.02 K/UL (ref 0–0.04)
IMM GRANULOCYTES NFR BLD AUTO: 0.3 % (ref 0–0.5)
LYMPHOCYTES # BLD AUTO: 2.8 K/UL (ref 1–4.8)
LYMPHOCYTES NFR BLD: 43.7 % (ref 18–48)
MAGNESIUM SERPL-MCNC: 2.8 MG/DL (ref 1.6–2.6)
MCH RBC QN AUTO: 24.5 PG (ref 27–31)
MCHC RBC AUTO-ENTMCNC: 31.1 G/DL (ref 32–36)
MCV RBC AUTO: 79 FL (ref 82–98)
MONOCYTES # BLD AUTO: 0.7 K/UL (ref 0.3–1)
MONOCYTES NFR BLD: 10.2 % (ref 4–15)
NEUTROPHILS # BLD AUTO: 2.8 K/UL (ref 1.8–7.7)
NEUTROPHILS NFR BLD: 43.9 % (ref 38–73)
NRBC BLD-RTO: 0 /100 WBC
PLATELET # BLD AUTO: 572 K/UL (ref 150–450)
PMV BLD AUTO: 9.3 FL (ref 9.2–12.9)
RBC # BLD AUTO: 3.55 M/UL (ref 4–5.4)
WBC # BLD AUTO: 6.36 K/UL (ref 3.9–12.7)

## 2022-07-14 PROCEDURE — 85025 COMPLETE CBC W/AUTO DIFF WBC: CPT | Performed by: NURSE PRACTITIONER

## 2022-07-14 PROCEDURE — 99233 PR SUBSEQUENT HOSPITAL CARE,LEVL III: ICD-10-PCS | Mod: 24,,, | Performed by: PHYSICIAN ASSISTANT

## 2022-07-14 PROCEDURE — 25000003 PHARM REV CODE 250: Performed by: NURSE PRACTITIONER

## 2022-07-14 PROCEDURE — 25000003 PHARM REV CODE 250: Performed by: INTERNAL MEDICINE

## 2022-07-14 PROCEDURE — 99233 SBSQ HOSP IP/OBS HIGH 50: CPT | Mod: 24,,, | Performed by: PHYSICIAN ASSISTANT

## 2022-07-14 PROCEDURE — 25000003 PHARM REV CODE 250: Performed by: FAMILY MEDICINE

## 2022-07-14 PROCEDURE — 83735 ASSAY OF MAGNESIUM: CPT | Performed by: NURSE PRACTITIONER

## 2022-07-14 RX ORDER — ASCORBIC ACID 500 MG
500 TABLET ORAL NIGHTLY
Qty: 30 TABLET | Refills: 0 | Status: SHIPPED | OUTPATIENT
Start: 2022-07-14 | End: 2022-08-13

## 2022-07-14 RX ORDER — CHLORHEXIDINE GLUCONATE 40 MG/ML
SOLUTION TOPICAL DAILY
Qty: 946 ML | Refills: 1 | Status: SHIPPED | OUTPATIENT
Start: 2022-07-14 | End: 2022-07-28

## 2022-07-14 RX ORDER — AMOXICILLIN AND CLAVULANATE POTASSIUM 875; 125 MG/1; MG/1
1 TABLET, FILM COATED ORAL EVERY 12 HOURS
Qty: 60 TABLET | Refills: 0 | Status: SHIPPED | OUTPATIENT
Start: 2022-07-14 | End: 2022-08-13

## 2022-07-14 RX ORDER — IBUPROFEN 200 MG
1 TABLET ORAL DAILY
Qty: 28 PATCH | Refills: 0 | Status: SHIPPED | OUTPATIENT
Start: 2022-07-14 | End: 2022-08-11

## 2022-07-14 RX ORDER — DOCUSATE SODIUM 100 MG/1
100 CAPSULE, LIQUID FILLED ORAL 2 TIMES DAILY
Qty: 60 CAPSULE | Refills: 0 | Status: SHIPPED | OUTPATIENT
Start: 2022-07-14 | End: 2022-08-13

## 2022-07-14 RX ORDER — OXYCODONE AND ACETAMINOPHEN 10; 325 MG/1; MG/1
1 TABLET ORAL EVERY 4 HOURS PRN
Qty: 30 TABLET | Refills: 0 | Status: SHIPPED | OUTPATIENT
Start: 2022-07-14

## 2022-07-14 RX ORDER — LANOLIN ALCOHOL/MO/W.PET/CERES
400 CREAM (GRAM) TOPICAL DAILY
Qty: 30 TABLET | Refills: 0 | Status: SHIPPED | OUTPATIENT
Start: 2022-07-15 | End: 2022-08-14

## 2022-07-14 RX ORDER — ACETAMINOPHEN 325 MG/1
650 TABLET ORAL EVERY 6 HOURS PRN
Refills: 0
Start: 2022-07-14

## 2022-07-14 RX ORDER — FERROUS GLUCONATE 324(38)MG
324 TABLET ORAL 2 TIMES DAILY WITH MEALS
Qty: 60 TABLET | Refills: 0 | Status: SHIPPED | OUTPATIENT
Start: 2022-07-14 | End: 2022-08-13

## 2022-07-14 RX ORDER — LORAZEPAM 0.5 MG/1
0.5 TABLET ORAL DAILY PRN
Qty: 5 TABLET | Refills: 0 | Status: SHIPPED | OUTPATIENT
Start: 2022-07-14

## 2022-07-14 RX ADMIN — LORAZEPAM 0.5 MG: 0.5 TABLET ORAL at 08:07

## 2022-07-14 RX ADMIN — Medication 324 MG: at 08:07

## 2022-07-14 RX ADMIN — DOCUSATE SODIUM 100 MG: 100 CAPSULE, LIQUID FILLED ORAL at 08:07

## 2022-07-14 RX ADMIN — OXYCODONE AND ACETAMINOPHEN 1 TABLET: 10; 325 TABLET ORAL at 05:07

## 2022-07-14 RX ADMIN — Medication 400 MG: at 08:07

## 2022-07-14 RX ADMIN — THERA TABS 1 TABLET: TAB at 08:07

## 2022-07-14 RX ADMIN — AMOXICILLIN AND CLAVULANATE POTASSIUM 1 TABLET: 875; 125 TABLET, FILM COATED ORAL at 08:07

## 2022-07-14 RX ADMIN — METHADONE HYDROCHLORIDE 10 MG: 10 TABLET ORAL at 08:07

## 2022-07-14 NOTE — PLAN OF CARE
[facility] - Med Surg  Discharge Final Note    Primary Care Provider: Primary Doctor No    Expected Discharge Date: 7/14/2022    Final Discharge Note (most recent)     Final Note - 07/14/22 0950        Final Note    Assessment Type Final Discharge Note     Anticipated Discharge Disposition Home or Self Care     Hospital Resources/Appts/Education Provided Provided patient/caregiver with written discharge plan information;Appointments scheduled and added to AVS        Post-Acute Status    Discharge Delays None known at this time                 Important Message from Medicare             Contact Info     Buzz Tilley MD   Specialty: Pulmonary Disease    [address]  [place] [address]   Phone: [phone]       Next Steps: Follow up in 1 month(s)    Instructions: Review CXR on return-  Take Xray 1-2 days before appointment with Dr. Jarek Alcazar, Primary Doctor   Relationship: PCP - General        Next Steps: Follow up in 1 week(s)    [facility]        Next Steps: Follow up    Instructions: Take walkins from 5:15 am - 9 am   [phone]        Patient to dc home with self care. FU appt scheduled and added to AVS. No other cm needs.

## 2022-07-14 NOTE — CONSULTS
Cm spoke with patient, patient was agreeable to receiving resources. Cm gave resource packet to charge nurse to get it to patient due to patient being on airborne isolation.

## 2022-07-14 NOTE — DISCHARGE SUMMARY
[facility] Medicine  Discharge Summary    Patient Name: Carolina Frank  MRN: [id number]  Patient Class: IP- Inpatient  Admission Date: 7/9/2022  Hospital Length of Stay: 5 days  Discharge Date and Time:  07/14/2022 1:18 PM  Attending Physician: Inge att. providers found   Discharging Provider: GINO Snow  Primary Care Provider: Primary Doctor Inge    HPI:   Ms. Frank is a 27-year-old  female with no significant medical problems, substance abuse, tobacco user, presented to the ED complaining of for 5-6 days of right-sided pleuritic-type chest discomfort, associated with dry nonproductive cough.  She denies fever, chills.  Not requiring supplemental oxygen.  In the ED she was found to have 5.1 x 2.2 x 4.5 cm cavitary lesion with air-fluid level in the right medial lower lobe, concerning for abscess.  Received IV vancomycin, cefepime in the ED.  afebrile, hemodynamically stable.  UDS positive for amphetamines.    Admitting diagnosis:  Right lung abscess    * No surgery found *      Hospital Course:   7/10:  Continue broad-spectrum antibiotics.  Cultures pending.  Patient currently stable on room air.  Plan for midline.  Patient endorses using heroin daily.  Will start  methadone for withdrawal symptoms.  7/11 Left hand abscess. Consult General surgery for possible I&D. Add daily hibiclens bath.  7/12 Ortho consulted for I&D of left hand abscess. Add ativan for anxiety.   7/13 Monitor anemia. Add Iv venofer. Repeat Ct scan of chest.  consulted to provide information for inpatient and outpatient options for substance abuse.   7/14 Patient stable and improved. CT scan chest improved. Patient discharged to home. Long discussion with Patient who verbalized wishes to quit Heroine. Patient given information for options for substance abuse treatment.      Goals of Care Treatment Preferences:     Consults:   Consults (From admission, onward)        Status Ordering Provider      Inpatient consult to Social Work           Completed JOHNSON HADDAD     Inpatient consult to Orthopedic Surgery  Once        Provider: Dr. Nando Morris    Acknowledged JOHNSON HADDAD     Inpatient consult to Pulmonology  Once        Provider:  Dr. Tilley    Completed RYNE GRAVES          Final Active Diagnoses:    Diagnosis Date Noted POA    PRINCIPAL PROBLEM:  Abscess of lower lobe of right lung with pneumonia - superior segment [J85.1] 07/09/2022 Yes    Hypomagnesemia [E83.42] 07/13/2022 Yes    Microcytic anemia [D50.9] 07/11/2022 Yes    Abscess of left hand [L02.512] 07/11/2022 Yes    Thrombocytosis [D75.839] 07/11/2022 Yes    Hypoalbuminemia [E88.09] 07/11/2022 Yes    Edema of right upper arm [R60.0] 07/10/2022 No    Substance abuse- Heroine [F19.10] 07/09/2022 Yes    Nicotine dependence [F17.200] 07/09/2022 Yes      Problems Resolved During this Admission:    Diagnosis Date Noted Date Resolved POA    Hyponatremia [E87.1] 07/11/2022 07/13/2022 Yes       Discharged Condition: stable    Disposition: Home or Self Care    Follow Up:   Follow-up Information     Buzz Tilley MD Follow up in 1 month(s).    Specialty: Pulmonary Disease  Why: Review CXR on return-  Take Xray 1-2 days before appointment with Dr. Tilley  Contact information:  [address]  [place] [address] [phone]             Primary Doctor No Follow up in 1 week(s).           [facility] Follow up.    Why: Take walkins from 5:15 am - 9 am   [phone]           [facility] Follow up on 7/15/2022.    Why: Hospital Follow-up   3:30PM  Contact information:  [address] [place] [address] [phone]                     Patient Instructions:      X-Ray Chest PA And Lateral   Standing Status: Future Standing Exp. Date: 09/14/22   Scheduling Instructions: Repeat CXR in one month- Take Xray 1-2 days before appointment with Dr. Tilley     Order Specific  Question Answer Comments   May the Radiologist modify the order per protocol to meet the clinical needs of the patient? Yes    Release to patient Immediate      Notify your health care provider if you experience any of the following:  temperature >100.4     Notify your health care provider if you experience any of the following:  severe uncontrolled pain     Notify your health care provider if you experience any of the following:  persistent dizziness, light-headedness, or visual disturbances     Notify your health care provider if you experience any of the following:  increased confusion or weakness     Notify your health care provider if you experience any of the following:  difficulty breathing or increased cough     Notify your health care provider if you experience any of the following:   Order Comments: Any decline in condition     Change dressing (specify)   Order Comments: Dressing change: As per Ortho instructions     Activity as tolerated       Significant Diagnostic Studies:    CMP   Recent Labs   Lab 07/13/22  0524      K 4.1      CO2 27      BUN 6   CREATININE 0.6   CALCIUM 8.4*   PROT 6.9   ALBUMIN 2.4*   BILITOT 0.2   ALKPHOS 76   AST 22   ALT 17   ANIONGAP 8   ESTGFRAFRICA >60   EGFRNONAA >60   CBC   Recent Labs   Lab 07/13/22  0524 07/14/22  0532   WBC 7.24 6.36   HGB 8.5* 8.7*   HCT 27.2* 28.0*   * 572*      Radiology Results (last 7 days)    Procedure Component Value Units Date/Time   CT Chest Without Contrast [[id number]] Resulted: 07/13/22 1135   Order Status: Completed Updated: 07/13/22 1138   Narrative:     EXAMINATION:   CT CHEST WITHOUT CONTRAST, multiplanar reconstructions     CLINICAL HISTORY:   Pneumonia, unresolved;Aspiration;Compare to prior;     TECHNIQUE:   Axial images through the chest were obtained without the use of IV contrast. Sagittal and coronal <reconstructions are provided for review>.     COMPARISON:   June 9, 2022     FINDINGS:   The amount of  parenchymal density surrounding the medial right lower lobe cavitary lesion has decreased in the interim.  The overall size of the complex cavitary lesion as well as the amount of air centrally has decreased in the interim, measuring a maximum of 7.9 cm in length on image 59 of series 2, previously measuring 8.7 cm on the same.  The lungs are free of new pulmonary opacities.     Prominent but not abnormal by size criteria mediastinal lymph nodes again seen.  Small pericardial effusion.     The airways are patent.  The thyroid gland demonstrates the presence of multiple small nodules measuring up to 5 mm.  The esophagus is normal.     The upper abdominal organs are unchanged.     The osseous structures are unchanged.    Impression:       1.  Interval improvement.  The overall size of the complex cavitary lesion in the medial right lower lobe with surrounding parenchymal opacity has mildly decreased in the interim as detailed above.  Continued follow-up studies recommended.     2.  Stable findings as noted above to include a small pericardial effusion.     All CT scans at this facility are performed  using dose modulation techniques as appropriate to performed exam including the following:  automated exposure control; adjustment of mA and/or kV according to the patients size (this includes techniques or standardized protocols for targeted exams where dose is matched to indication/reason for exam: i.e. extremities or head);  iterative reconstruction technique.       Electronically signed by: Sanchez Lal MD   Date: 07/13/2022   Time: 11:35   US Upper Extremity Veins Bilateral [[id number]] Resulted: 07/10/22 1225   Order Status: Completed Updated: 07/10/22 1227   Narrative:     EXAMINATION:   US UPPER EXTREMITY VEINS BILATERAL     CLINICAL HISTORY:   swelling right upper extremity;     COMPARISON:   None available     FINDINGS:   The deep veins of the bilateral upper extremities are widely patent without evidence of  thrombosis.    Impression:       Negative for DVT.       Electronically signed by: Deandre Austin MD   Date: 07/10/2022   Time: 12:25   CT Chest Without Contrast [[id number]] Resulted: 07/09/22 2023   Order Status: Completed Updated: 07/09/22 2026   Narrative:     EXAMINATION:   CT CHEST WITHOUT CONTRAST     CLINICAL HISTORY:   Cough, persistent;abdnormal chest x-ray, radiologist recommended;     TECHNIQUE:   5 mm axial images were acquired using helical CT technique from the lung apices through costophrenic sulci.  No intravenous contrast was administered.     COMPARISON:   Prior radiograph     FINDINGS:   Air-fluid level involving the cavity in the medial right lower lobe measuring 5.1 x 2.2  x 4.5 cm worrisome for abscess.  There is adjacent consolidation involving the superior segment of right lower lobe extending to the basilar segment of right lower lobe.    Impression:       Right lower lobe pneumonia with cavity formation and air-fluid level measuring 5.1 x 2.2 x 4.5 cm suggestive of associated abscess     All CT scans at this facility use dose modulation, iterative reconstruction and/or weight based dosing when appropriate to reduce radiation dose to as low as reasonably achievable.       Electronically signed by: Cheko Boggs   Date: 07/09/2022   Time: 20:23   X-Ray Chest PA And Lateral [[id number]] Resulted: 07/09/22 1843   Order Status: Completed Updated: 07/09/22 1846   Narrative:     EXAMINATION:   XR CHEST PA AND LATERAL     CLINICAL HISTORY:   Cough, unspecified     TECHNIQUE:   PA and lateral views of the chest were performed.     COMPARISON:   None     FINDINGS:   Air-fluid level and opacity along the right hilar border.  No acute osseous injury.  Cardiac silhouette within normal limits.    Impression:       Unusual focal opacity along the right hilar border with questionable air-fluid level.  Question pneumonia.  This is only seen on the frontal projection and not the lateral projection.  Recommend  follow-up CT.       Electronically signed by: Cheko Boggs   Date: 07/09/2022   Time: 18:43       Microbiology Results       Procedure Component Value Units Date/Time   AFB Culture & Smear [[id number]] Collected: 07/12/22 1727   Order Status: Completed Specimen: Respiratory from Sputum, Expectorated Updated: 07/14/22 0927    AFB Culture & Smear Culture in progress    AFB CULTURE STAIN No acid fast bacilli seen.   Culture, Anaerobe [[id number]] Collected: 07/12/22 1252   Order Status: Completed Specimen: Abscess from Hand, Left Updated: 07/14/22 0725    Anaerobic Culture Culture in progress   Aerobic culture [[id number]] (Abnormal) Collected: 07/12/22 1252   Order Status: Completed Specimen: Abscess from Hand, Left Updated: 07/14/22 1001    Aerobic Bacterial Culture STAPHYLOCOCCUS AUREUS   Moderate   Susceptibility pending    Abnormal    Fungus culture [[id number]] Collected: 07/12/22 1252   Order Status: Completed Specimen: Abscess from Hand, Left Updated: 07/14/22 1019    Fungus (Mycology) Culture Culture in progress   Gram stain [[id number]] Collected: 07/12/22 1252   Order Status: Completed Specimen: Abscess from Hand, Left Updated: 07/13/22 0838    Gram Stain Result No WBC's     No organisms seen   AFB Culture & Smear [[id number]]    Order Status: No result Specimen: Respiratory from Sputum, Induced    Culture, MRSA [[id number]] Collected: 07/11/22 1101   Order Status: Completed Specimen: MRSA source from Nares, Left Updated: 07/13/22 0717    MRSA Surveillance Screen No MRSA isolated   Culture, Respiratory with Gram Stain [[id number]]    Order Status: No result Specimen: Respiratory from Sputum, Expectorated    AFB Culture & Smear [[id number]]    Order Status: No result Specimen: Respiratory from Sputum, Expectorated    AFB Culture & Smear [[id number]]    Order Status: No result Specimen: Respiratory from Sputum, Expectorated    Culture, Respiratory with Gram Stain [[id number]] Collected: 07/09/22 2302   Order Status:  Completed Specimen: Respiratory from Sputum Updated: 07/12/22 0958    Respiratory Culture Normal respiratory elvia     No S aureus or Pseudomonas isolated.    Gram Stain (Respiratory) <10 epithelial cells per low power field.    Gram Stain (Respiratory) Rare WBC's    Gram Stain (Respiratory) Rare Gram positive cocci   AFB Culture & Smear [[id number]] Collected: 07/09/22 2302   Order Status: Completed Specimen: Respiratory from Sputum Updated: 07/11/22 2127    AFB Culture & Smear Culture in progress    AFB CULTURE STAIN No acid fast bacilli seen.   Blood Culture #1 **CANNOT BE ORDERED STAT** [[id number]] Collected: 07/09/22 2112   Order Status: Completed Specimen: Blood from Peripheral, Upper Arm, Left Updated: 07/14/22 0612    Blood Culture, Routine No Growth to date     No Growth to date     No Growth to date     No Growth to date     No Growth to date   Blood Culture #2 **CANNOT BE ORDERED STAT** [[id number]] Collected: 07/09/22 2110   Order Status: Completed Specimen: Blood from Peripheral, Forearm, Right Updated: 07/14/22 0612    Blood Culture, Routine No Growth to date     No Growth to date     No Growth to date     No Growth to date     No Growth to date         Pending Diagnostic Studies:     Procedure Component Value Units Date/Time    Fungal Immunodiffusion - Blood [[id number]] Collected: 07/12/22 0542    Order Status: Sent Lab Status: In process Updated: 07/12/22 1412    Specimen: Blood          Medications:  Reconciled Home Medications:      Medication List      START taking these medications    acetaminophen 325 MG tablet  Commonly known as: TYLENOL  Take 2 tablets (650 mg total) by mouth every 6 (six) hours as needed for Pain (Mild to moderate pain- Do not take with any other tylenol or acetaminophen containing products).     amoxicillin-clavulanate 875-125mg 875-125 mg per tablet  Commonly known as: AUGMENTIN  Take 1 tablet by mouth every 12 (twelve) hours. Patient to be treated for 30 days for her lung  abscess     ascorbic acid (vitamin C) 500 MG tablet  Commonly known as: VITAMIN C  Take 1 tablet (500 mg total) by mouth every evening.     chlorhexidine 4 % external liquid  Commonly known as: HIBICLENS  Apply topically once daily at 6am. Bathe or shower with daily x 2 weeks for 14 days     docusate sodium 100 MG capsule  Commonly known as: COLACE  Take 1 capsule (100 mg total) by mouth 2 (two) times daily.     ferrous gluconate 324 MG tablet  Commonly known as: FERGON  Take 1 tablet (324 mg total) by mouth 2 (two) times daily with meals.     HIGH POTENCY MULTIVIT (W-IRON) 9 mg iron-400 mcg Tab tablet  Generic drug: multivit-iron-FA-calcium-mins  Take 1 tablet by mouth once daily.  Start taking on: July 15, 2022     LORazepam 0.5 MG tablet  Commonly known as: ATIVAN  Take 1 tablet (0.5 mg total) by mouth daily as needed for Anxiety.     magnesium oxide 400 mg (241.3 mg magnesium) tablet  Commonly known as: MAG-OX  Take 1 tablet (400 mg total) by mouth once daily.  Start taking on: July 15, 2022     nicotine 21 mg/24 hr  Commonly known as: NICODERM CQ  Place 1 patch onto the skin once daily.     oxyCODONE-acetaminophen  mg per tablet  Commonly known as: PERCOCET  Take 1 tablet by mouth every 4 (four) hours as needed (Moderate to severe pain).        CONTINUE taking these medications    valACYclovir 500 MG tablet  Commonly known as: VALTREX  Take 1 tablet (500 mg total) by mouth 2 (two) times daily.        STOP taking these medications    ciprofloxacin HCl 500 MG tablet  Commonly known as: CIPRO            Indwelling Lines/Drains at time of discharge:   Lines/Drains/Airways     None                 Time spent on the discharge of patient: 67 minutes       GINO Snow  Department of Hospital Medicine  [facility] Surg

## 2022-07-14 NOTE — PROGRESS NOTES
"[facility]  Orthopedics  Progress Note    Patient Name: Carolina Frank  MRN: [id number]  Admission Date: 7/9/2022  Hospital Length of Stay: 5 days  Attending Provider: Eusebio Monroy MD  Primary Care Provider: Primary Doctor No    Subjective:     Principal Problem:Abscess of lower lobe of right lung with pneumonia    Principal Orthopedic Problem:  Left hand abscess    Interval History: Carolina Frank is a 27-year-old female with history of IV drug abuse who had bedside I and D performed 2 days ago for abscess to the dorsum of the left hand.  Patient is resting comfortably in bed.  Bandage that was placed at the time of the procedure is still intact    Review of patient's allergies indicates:  No Known Allergies    Current Facility-Administered Medications   Medication    acetaminophen tablet 650 mg    albuterol-ipratropium 2.5 mg-0.5 mg/3 mL nebulizer solution 3 mL    aluminum-magnesium hydroxide-simethicone 200-200-20 mg/5 mL suspension 30 mL    amoxicillin-clavulanate 875-125mg per tablet 1 tablet    ascorbic acid (vitamin C) tablet 500 mg    docusate sodium capsule 100 mg    ferrous gluconate tablet 324 mg    guaiFENesin 100 mg/5 ml syrup 200 mg    LORazepam tablet 0.5 mg    magnesium oxide tablet 400 mg    melatonin tablet 6 mg    methadone tablet 10 mg    multivitamin tablet    nicotine 21 mg/24 hr 1 patch    ondansetron injection 4 mg    oxyCODONE-acetaminophen  mg per tablet 1 tablet    oxyCODONE-acetaminophen 5-325 mg per tablet 1 tablet     Objective:     Vital Signs (Most Recent):  Temp: 99 °F (37.2 °C) (07/14/22 0849)  Pulse: 85 (07/14/22 0849)  Resp: 16 (07/14/22 0849)  BP: (!) 100/55 (07/14/22 0849)  SpO2: 99 % (07/14/22 0849) Vital Signs (24h Range):  Temp:  [98 °F (36.7 °C)-99 °F (37.2 °C)] 99 °F (37.2 °C)  Pulse:  [77-96] 85  Resp:  [16-20] 16  SpO2:  [97 %-99 %] 99 %  BP: (100-113)/(55-71) 100/55     Weight: 56.6 kg (124 lb 12.5 oz)  Height: 5' 4" (162.6 cm)  Body mass " index is 21.42 kg/m².      Intake/Output Summary (Last 24 hours) at 7/14/2022 1009  Last data filed at 7/13/2022 1813  Gross per 24 hour   Intake 904.15 ml   Output --   Net 904.15 ml       Ortho/SPM Exam   Left hand:  Dressing is clean, dry, and intact  Dressing removed for physical exam  Packing pulled today  Small amount of purulence discharge was expressed from the wound  Moderate edema  ROM normal  Motor exam normal  Sensation and pulses intact    GEN: Well developed, well nourished female. AAOX3. No acute distress.   Normocephalic, atraumatic.   DONALD  Breathing unlabored.  Mood and affect appropriate.      Significant Labs: All pertinent labs within the past 24 hours have been reviewed.    Significant Imaging: I have reviewed and interpreted all pertinent imaging results/findings.    Assessment/Plan:     Active Diagnoses:    Diagnosis Date Noted POA    PRINCIPAL PROBLEM:  Abscess of lower lobe of right lung with pneumonia - superior segment [J85.1] 07/09/2022 Yes    Hypomagnesemia [E83.42] 07/13/2022 Yes    Microcytic anemia [D50.9] 07/11/2022 Yes    Abscess of left hand [L02.512] 07/11/2022 Yes    Thrombocytosis [D75.839] 07/11/2022 Yes    Hypoalbuminemia [E88.09] 07/11/2022 Yes    Edema of right upper arm [R60.0] 07/10/2022 No    Substance abuse- Heroine [F19.10] 07/09/2022 Yes    Nicotine dependence [F17.200] 07/09/2022 Yes      Problems Resolved During this Admission:    Diagnosis Date Noted Date Resolved POA    Hyponatremia [E87.1] 07/11/2022 07/13/2022 Yes     Assessment:  27-year-old female with abscess of lower lobe of right lung currently being isolated pending rule out of to regulate his an abscess to the dorsum of the left hand    Plan:  Dressing changed and packing pulled  Will consult Wound Care for further dressing changes    Nando Morris PA-C  Orthopedics  [facility]

## 2022-07-14 NOTE — PLAN OF CARE
Patient remained free from injury. PO abx given. No s/s of acute distress. Will continue to monitor.

## 2022-07-15 LAB
BACTERIA BLD CULT: NORMAL
BACTERIA BLD CULT: NORMAL
BACTERIA SPEC AEROBE CULT: ABNORMAL
M TB CMPLX DNA SPEC QL NAA+PROBE: NORMAL
SPECIMEN SOURCE: NORMAL

## 2022-07-16 LAB
ASPERGILLUS AB SER QL ID: NORMAL
B DERMAT AB SER QL ID: NORMAL
C IMMITIS AB SER QL ID: NOT DETECTED
H CAPSUL AB SER QL ID: NORMAL

## 2022-07-18 ENCOUNTER — TELEPHONE (OUTPATIENT)
Dept: ORTHOPEDICS | Facility: CLINIC | Age: 27
End: 2022-07-18
Payer: MEDICAID

## 2022-07-18 LAB — BACTERIA SPEC ANAEROBE CULT: NORMAL

## 2022-07-18 NOTE — TELEPHONE ENCOUNTER
LVM for patient to please return phone call. Per Susannah FRANCO, cx results came back and she needs a new abx. Pt has no other number in chart. Pt to come in today at 1pm or tomorrow at 11am per Susannah FRANCO.

## 2022-07-19 ENCOUNTER — TELEPHONE (OUTPATIENT)
Dept: ORTHOPEDICS | Facility: CLINIC | Age: 27
End: 2022-07-19
Payer: MEDICAID

## 2022-08-17 LAB — FUNGUS SPEC CULT: NORMAL

## 2022-08-28 LAB
ACID FAST MOD KINY STN SPEC: NORMAL
MYCOBACTERIUM SPEC QL CULT: NORMAL

## 2022-08-31 LAB
ACID FAST MOD KINY STN SPEC: NORMAL
MYCOBACTERIUM SPEC QL CULT: NORMAL